# Patient Record
Sex: MALE | Race: WHITE | NOT HISPANIC OR LATINO | Employment: UNEMPLOYED | URBAN - METROPOLITAN AREA
[De-identification: names, ages, dates, MRNs, and addresses within clinical notes are randomized per-mention and may not be internally consistent; named-entity substitution may affect disease eponyms.]

---

## 2022-01-05 ENCOUNTER — HOSPITAL ENCOUNTER (EMERGENCY)
Facility: HOSPITAL | Age: 32
End: 2022-01-06
Attending: EMERGENCY MEDICINE
Payer: COMMERCIAL

## 2022-01-05 ENCOUNTER — APPOINTMENT (EMERGENCY)
Dept: CT IMAGING | Facility: HOSPITAL | Age: 32
End: 2022-01-05
Payer: COMMERCIAL

## 2022-01-05 DIAGNOSIS — F10.231 ALCOHOL WITHDRAWAL DELIRIUM, ACUTE, HYPERACTIVE (HCC): ICD-10-CM

## 2022-01-05 DIAGNOSIS — F11.23 OPIATE WITHDRAWAL (HCC): ICD-10-CM

## 2022-01-05 DIAGNOSIS — R41.82 ALTERED MENTAL STATUS: Primary | ICD-10-CM

## 2022-01-05 LAB
ALBUMIN SERPL BCP-MCNC: 4 G/DL (ref 3.5–5)
ALP SERPL-CCNC: 65 U/L (ref 46–116)
ALT SERPL W P-5'-P-CCNC: 21 U/L (ref 12–78)
ANION GAP SERPL CALCULATED.3IONS-SCNC: 6 MMOL/L (ref 4–13)
APAP SERPL-MCNC: <2 UG/ML (ref 10–20)
AST SERPL W P-5'-P-CCNC: 43 U/L (ref 5–45)
BASOPHILS # BLD AUTO: 0.04 THOUSANDS/ΜL (ref 0–0.1)
BASOPHILS NFR BLD AUTO: 0 % (ref 0–1)
BILIRUB SERPL-MCNC: 0.7 MG/DL (ref 0.2–1)
BUN SERPL-MCNC: 10 MG/DL (ref 5–25)
CALCIUM SERPL-MCNC: 9.2 MG/DL (ref 8.3–10.1)
CHLORIDE SERPL-SCNC: 102 MMOL/L (ref 100–108)
CO2 SERPL-SCNC: 28 MMOL/L (ref 21–32)
CREAT SERPL-MCNC: 0.37 MG/DL (ref 0.6–1.3)
EOSINOPHIL # BLD AUTO: 0 THOUSAND/ΜL (ref 0–0.61)
EOSINOPHIL NFR BLD AUTO: 0 % (ref 0–6)
ERYTHROCYTE [DISTWIDTH] IN BLOOD BY AUTOMATED COUNT: 12.9 % (ref 11.6–15.1)
ETHANOL SERPL-MCNC: <3 MG/DL (ref 0–3)
GFR SERPL CREATININE-BSD FRML MDRD: 165 ML/MIN/1.73SQ M
GLUCOSE SERPL-MCNC: 95 MG/DL (ref 65–140)
GLUCOSE SERPL-MCNC: 96 MG/DL (ref 65–140)
HCT VFR BLD AUTO: 45.9 % (ref 36.5–49.3)
HGB BLD-MCNC: 15.3 G/DL (ref 12–17)
IMM GRANULOCYTES # BLD AUTO: 0.02 THOUSAND/UL (ref 0–0.2)
IMM GRANULOCYTES NFR BLD AUTO: 0 % (ref 0–2)
LYMPHOCYTES # BLD AUTO: 2.45 THOUSANDS/ΜL (ref 0.6–4.47)
LYMPHOCYTES NFR BLD AUTO: 25 % (ref 14–44)
MAGNESIUM SERPL-MCNC: 2.4 MG/DL (ref 1.6–2.6)
MCH RBC QN AUTO: 29.4 PG (ref 26.8–34.3)
MCHC RBC AUTO-ENTMCNC: 33.3 G/DL (ref 31.4–37.4)
MCV RBC AUTO: 88 FL (ref 82–98)
MONOCYTES # BLD AUTO: 0.88 THOUSAND/ΜL (ref 0.17–1.22)
MONOCYTES NFR BLD AUTO: 9 % (ref 4–12)
NEUTROPHILS # BLD AUTO: 6.45 THOUSANDS/ΜL (ref 1.85–7.62)
NEUTS SEG NFR BLD AUTO: 66 % (ref 43–75)
NRBC BLD AUTO-RTO: 0 /100 WBCS
PLATELET # BLD AUTO: 428 THOUSANDS/UL (ref 149–390)
PMV BLD AUTO: 9.7 FL (ref 8.9–12.7)
POTASSIUM SERPL-SCNC: 5.4 MMOL/L (ref 3.5–5.3)
PROT SERPL-MCNC: 7.6 G/DL (ref 6.4–8.2)
RBC # BLD AUTO: 5.21 MILLION/UL (ref 3.88–5.62)
SALICYLATES SERPL-MCNC: <3 MG/DL (ref 3–20)
SODIUM SERPL-SCNC: 136 MMOL/L (ref 136–145)
WBC # BLD AUTO: 9.84 THOUSAND/UL (ref 4.31–10.16)

## 2022-01-05 PROCEDURE — 36415 COLL VENOUS BLD VENIPUNCTURE: CPT | Performed by: EMERGENCY MEDICINE

## 2022-01-05 PROCEDURE — 96361 HYDRATE IV INFUSION ADD-ON: CPT

## 2022-01-05 PROCEDURE — 99285 EMERGENCY DEPT VISIT HI MDM: CPT

## 2022-01-05 PROCEDURE — G1004 CDSM NDSC: HCPCS

## 2022-01-05 PROCEDURE — 99285 EMERGENCY DEPT VISIT HI MDM: CPT | Performed by: EMERGENCY MEDICINE

## 2022-01-05 PROCEDURE — U0005 INFEC AGEN DETEC AMPLI PROBE: HCPCS | Performed by: EMERGENCY MEDICINE

## 2022-01-05 PROCEDURE — 82077 ASSAY SPEC XCP UR&BREATH IA: CPT | Performed by: EMERGENCY MEDICINE

## 2022-01-05 PROCEDURE — 80053 COMPREHEN METABOLIC PANEL: CPT | Performed by: EMERGENCY MEDICINE

## 2022-01-05 PROCEDURE — 83735 ASSAY OF MAGNESIUM: CPT | Performed by: EMERGENCY MEDICINE

## 2022-01-05 PROCEDURE — 80179 DRUG ASSAY SALICYLATE: CPT | Performed by: EMERGENCY MEDICINE

## 2022-01-05 PROCEDURE — 96376 TX/PRO/DX INJ SAME DRUG ADON: CPT

## 2022-01-05 PROCEDURE — 80143 DRUG ASSAY ACETAMINOPHEN: CPT | Performed by: EMERGENCY MEDICINE

## 2022-01-05 PROCEDURE — 85025 COMPLETE CBC W/AUTO DIFF WBC: CPT | Performed by: EMERGENCY MEDICINE

## 2022-01-05 PROCEDURE — 82948 REAGENT STRIP/BLOOD GLUCOSE: CPT

## 2022-01-05 PROCEDURE — 96374 THER/PROPH/DIAG INJ IV PUSH: CPT

## 2022-01-05 PROCEDURE — 96372 THER/PROPH/DIAG INJ SC/IM: CPT

## 2022-01-05 PROCEDURE — 70450 CT HEAD/BRAIN W/O DYE: CPT

## 2022-01-05 PROCEDURE — 93005 ELECTROCARDIOGRAM TRACING: CPT

## 2022-01-05 PROCEDURE — U0003 INFECTIOUS AGENT DETECTION BY NUCLEIC ACID (DNA OR RNA); SEVERE ACUTE RESPIRATORY SYNDROME CORONAVIRUS 2 (SARS-COV-2) (CORONAVIRUS DISEASE [COVID-19]), AMPLIFIED PROBE TECHNIQUE, MAKING USE OF HIGH THROUGHPUT TECHNOLOGIES AS DESCRIBED BY CMS-2020-01-R: HCPCS | Performed by: EMERGENCY MEDICINE

## 2022-01-05 RX ORDER — HALOPERIDOL 5 MG/ML
5 INJECTION INTRAMUSCULAR ONCE
Status: COMPLETED | OUTPATIENT
Start: 2022-01-05 | End: 2022-01-05

## 2022-01-05 RX ORDER — MULTIVITAMIN/IRON/FOLIC ACID 18MG-0.4MG
1 TABLET ORAL DAILY
Status: DISCONTINUED | OUTPATIENT
Start: 2022-01-06 | End: 2022-01-06 | Stop reason: HOSPADM

## 2022-01-05 RX ORDER — LORAZEPAM 2 MG/ML
4 INJECTION INTRAMUSCULAR ONCE
Status: COMPLETED | OUTPATIENT
Start: 2022-01-05 | End: 2022-01-06

## 2022-01-05 RX ORDER — FOLIC ACID 1 MG/1
1 TABLET ORAL DAILY
Status: DISCONTINUED | OUTPATIENT
Start: 2022-01-06 | End: 2022-01-06 | Stop reason: HOSPADM

## 2022-01-05 RX ORDER — LORAZEPAM 2 MG/ML
2 INJECTION INTRAMUSCULAR ONCE
Status: COMPLETED | OUTPATIENT
Start: 2022-01-05 | End: 2022-01-05

## 2022-01-05 RX ORDER — LORAZEPAM 2 MG/ML
4 INJECTION INTRAMUSCULAR ONCE
Status: COMPLETED | OUTPATIENT
Start: 2022-01-05 | End: 2022-01-05

## 2022-01-05 RX ORDER — LANOLIN ALCOHOL/MO/W.PET/CERES
100 CREAM (GRAM) TOPICAL DAILY
Status: DISCONTINUED | OUTPATIENT
Start: 2022-01-06 | End: 2022-01-06 | Stop reason: HOSPADM

## 2022-01-05 RX ADMIN — LORAZEPAM 2 MG: 2 INJECTION INTRAMUSCULAR; INTRAVENOUS at 19:24

## 2022-01-05 RX ADMIN — LORAZEPAM 4 MG: 2 INJECTION INTRAMUSCULAR; INTRAVENOUS at 22:10

## 2022-01-05 RX ADMIN — LORAZEPAM 4 MG: 2 INJECTION INTRAMUSCULAR; INTRAVENOUS at 20:41

## 2022-01-05 RX ADMIN — HALOPERIDOL LACTATE 5 MG: 5 INJECTION, SOLUTION INTRAMUSCULAR at 23:20

## 2022-01-05 RX ADMIN — SODIUM CHLORIDE 1000 ML: 0.9 INJECTION, SOLUTION INTRAVENOUS at 18:53

## 2022-01-05 NOTE — ED PROVIDER NOTES
History  Chief Complaint   Patient presents with    Altered Mental Status     started today while at [facility]; oriented to person, not place/time     This healthy 51-year-old male states he is withdrawing from alcohol and heroin  States he drank about 8 beers a day for the last 5-6 months  Last alcohol intake was 24 hours ago  Says he has been smoking heroin multiple times a day since Thanksgiving  His last dose was yesterday  He went to the [facility] today for help in detoxing but when he became tremulous, weak, confused and had diaphoresis and dysesthesias they sent him here for evaluation  He denies nausea vomiting dyspnea chest pain and abdominal pain  He denies recent illness and injury  None       History reviewed  No pertinent past medical history  History reviewed  No pertinent surgical history  History reviewed  No pertinent family history  I have reviewed and agree with the history as documented  E-Cigarette/Vaping     E-Cigarette/Vaping Substances     Social History     Tobacco Use    Smoking status: Never Smoker    Smokeless tobacco: Never Used   Substance Use Topics    Alcohol use: Yes    Drug use: Yes     Types: Heroin, Fentanyl     Comment: last used yesterday       Review of Systems   Constitutional: Positive for activity change, appetite change, chills, diaphoresis and fatigue  Negative for fever  Respiratory: Negative for shortness of breath  Cardiovascular: Negative for chest pain, palpitations and leg swelling  Neurological: Positive for tremors, weakness and light-headedness  Psychiatric/Behavioral: Positive for confusion and dysphoric mood  All other systems reviewed and are negative  Physical Exam  Physical Exam  Vitals and nursing note reviewed  Constitutional:       General: He is in acute distress  Appearance: He is well-developed  He is ill-appearing and diaphoretic  HENT:      Head: Normocephalic and atraumatic  Right Ear: External ear normal       Left Ear: External ear normal       Mouth/Throat:      Mouth: Mucous membranes are moist       Pharynx: Oropharynx is clear  Eyes:      General: No scleral icterus  Extraocular Movements: Extraocular movements intact  Conjunctiva/sclera: Conjunctivae normal       Pupils: Pupils are equal, round, and reactive to light  Neck:      Vascular: No JVD  Cardiovascular:      Rate and Rhythm: Normal rate and regular rhythm  Heart sounds: Normal heart sounds  No murmur heard  Pulmonary:      Effort: Pulmonary effort is normal       Breath sounds: Normal breath sounds  Abdominal:      General: Bowel sounds are normal       Palpations: Abdomen is soft  There is no mass  Tenderness: There is no abdominal tenderness  There is no guarding or rebound  Musculoskeletal:         General: No swelling or tenderness  Normal range of motion  Cervical back: Normal range of motion and neck supple  No rigidity  Lymphadenopathy:      Cervical: No cervical adenopathy  Skin:     General: Skin is warm  Capillary Refill: Capillary refill takes less than 2 seconds  Findings: No rash  Neurological:      Mental Status: He is alert and oriented to person, place, and time  GCS: GCS eye subscore is 4  GCS verbal subscore is 5  GCS motor subscore is 6  Cranial Nerves: No cranial nerve deficit  Sensory: No sensory deficit  Motor: No weakness  Coordination: Coordination normal       Gait: Gait normal       Deep Tendon Reflexes: Reflexes are normal and symmetric  Comments: At times for very brief periods the patient mumbles incoherently  He is then back to being oriented and able to converse  Psychiatric:         Mood and Affect: Mood is anxious           Behavior: Behavior normal          Vital Signs  ED Triage Vitals [01/05/22 1719]   Temperature Pulse Respirations Blood Pressure SpO2   99 1 °F (37 3 °C) (!) 119 20 129/75 97 % Temp Source Heart Rate Source Patient Position - Orthostatic VS BP Location FiO2 (%)   Temporal Monitor Sitting Left arm --      Pain Score       No Pain           Vitals:    01/06/22 0000 01/06/22 0015 01/06/22 0030 01/06/22 0100   BP: 140/79 130/95 130/81 129/94   Pulse: 90 80 70 71   Patient Position - Orthostatic VS:   Lying          Visual Acuity      ED Medications  Medications   sodium chloride 0 9 % bolus 1,000 mL (0 mL Intravenous Stopped 1/5/22 1953)   LORazepam (ATIVAN) injection 2 mg (2 mg Intravenous Given 1/5/22 1924)   LORazepam (ATIVAN) injection 4 mg (4 mg Intravenous Given 1/5/22 2041)   LORazepam (ATIVAN) injection 4 mg (4 mg Intravenous Given 1/5/22 2210)   haloperidol lactate (HALDOL) injection 5 mg (5 mg Intramuscular Given 1/5/22 2320)   LORazepam (ATIVAN) injection 4 mg (4 mg Intravenous Given 1/6/22 0109)       Diagnostic Studies  Results Reviewed     Procedure Component Value Units Date/Time    HS Troponin 0hr (reflex protocol) [[id number]]  (Normal) Collected: 01/06/22 0007    Lab Status: Final result Specimen: Blood from Arm, Right Updated: 01/06/22 0040     hs TnI 0hr <2 ng/L     Comprehensive metabolic panel [[id number]]  (Abnormal) Collected: 01/05/22 1840    Lab Status: Final result Specimen: Blood from Arm, Right Updated: 01/05/22 2001     Sodium 136 mmol/L      Potassium 5 4 mmol/L      Chloride 102 mmol/L      CO2 28 mmol/L      ANION GAP 6 mmol/L      BUN 10 mg/dL      Creatinine 0 37 mg/dL      Glucose 96 mg/dL      Calcium 9 2 mg/dL      AST 43 U/L      ALT 21 U/L      Alkaline Phosphatase 65 U/L      Total Protein 7 6 g/dL      Albumin 4 0 g/dL      Total Bilirubin 0 70 mg/dL      eGFR 165 ml/min/1 73sq m     Narrative:      Lisa guidelines for Chronic Kidney Disease (CKD):     Stage 1 with normal or high GFR (GFR > 90 mL/min/1 73 square meters)    Stage 2 Mild CKD (GFR = 60-89 mL/min/1 73 square meters)    Stage 3A Moderate CKD (GFR = 45-59 mL/min/1 73 square meters)    Stage 3B Moderate CKD (GFR = 30-44 mL/min/1 73 square meters)    Stage 4 Severe CKD (GFR = 15-29 mL/min/1 73 square meters)    Stage 5 End Stage CKD (GFR <15 mL/min/1 73 square meters)  Note: GFR calculation is accurate only with a steady state creatinine    Acetaminophen level-If concentration is detectable, please discuss with medical  on call   [[id number]]  (Abnormal) Collected: 01/05/22 1840    Lab Status: Final result Specimen: Blood from Arm, Right Updated: 01/05/22 2001     Acetaminophen Level <2 0 ug/mL     Ethanol [[id number]]  (Normal) Collected: 01/05/22 1840    Lab Status: Final result Specimen: Blood from Arm, Right Updated: 01/05/22 1933     Ethanol Lvl <3 mg/dL     Salicylate level [[id number]]  (Abnormal) Collected: 01/05/22 1840    Lab Status: Final result Specimen: Blood from Arm, Right Updated: 91/40/95 9599     Salicylate Lvl <3 mg/dL     Magnesium [[id number]]  (Normal) Collected: 01/05/22 1845    Lab Status: Final result Specimen: Blood from Arm, Right Updated: 01/05/22 1914     Magnesium 2 4 mg/dL     CBC and differential [[id number]]  (Abnormal) Collected: 01/05/22 1840    Lab Status: Final result Specimen: Blood from Arm, Right Updated: 01/05/22 1858     WBC 9 84 Thousand/uL      RBC 5 21 Million/uL      Hemoglobin 15 3 g/dL      Hematocrit 45 9 %      MCV 88 fL      MCH 29 4 pg      MCHC 33 3 g/dL      RDW 12 9 %      MPV 9 7 fL      Platelets 750 Thousands/uL      nRBC 0 /100 WBCs      Neutrophils Relative 66 %      Immat GRANS % 0 %      Lymphocytes Relative 25 %      Monocytes Relative 9 %      Eosinophils Relative 0 %      Basophils Relative 0 %      Neutrophils Absolute 6 45 Thousands/µL      Immature Grans Absolute 0 02 Thousand/uL      Lymphocytes Absolute 2 45 Thousands/µL      Monocytes Absolute 0 88 Thousand/µL      Eosinophils Absolute 0 00 Thousand/µL      Basophils Absolute 0 04 Thousands/µL     Fingerstick Glucose (POCT) [[id number]] (Normal) Collected: 01/05/22 1853    Lab Status: Final result Updated: 01/05/22 1856     POC Glucose 95 mg/dl     COVID only - 48 hour TAT [[id number]] Collected: 01/05/22 1840    Lab Status: In process Specimen: Nares from Nose Updated: 01/05/22 1855                 CT head without contrast   Final Result by Michael Toledo MD (01/05 2227)      No acute intracranial abnormality  Workstation performed: [id number]                    Procedures  ECG 12 Lead Documentation Only    Date/Time: 1/5/2022 6:42 PM  Performed by: Curly Iglesias DO  Authorized by: Curly Iglesias DO     ECG reviewed by me, the ED Provider: yes    Patient location:  ED  Previous ECG:     Previous ECG:  Unavailable    Comparison to cardiac monitor: Yes    Rhythm:     Rhythm: sinus rhythm    Ectopy:     Ectopy: none    QRS:     QRS axis:  Left    QRS intervals:  Normal  Conduction:     Conduction: abnormal      Abnormal conduction: LAFB    ST segments:     ST segments:  Normal  T waves:     T waves: normal               ED Course  ED Course as of 01/06/22 1247   Wed Jan 05, 2022 2037 Patient out of bed, pulled IVs out  Is shivering  It urinate in the bed  Appears somewhat confused  CIWA score is elevated  Will give Ativan  Texted [place] [facility]  2246 Discussed with the detox AP  High ordered troponin as per their request   CT was performed and is unremarkable  Patient placed in soft limb restraints she keeps on trying to eat his electrodes  SBIRT 20yo+      Most Recent Value   SBIRT (24 yo +)    In order to provide better care to our patients, we are screening all of our patients for alcohol and drug use  Would it be okay to ask you these screening questions? Yes Filed at: 01/05/2022 1835   Initial Alcohol Screen: US AUDIT-C     1  How often do you have a drink containing alcohol? 6 Filed at: 01/05/2022 1835   2   How many drinks containing alcohol do you have on a typical day you are drinking? 5 Filed at: 01/05/2022 1835   3a  Male UNDER 65: How often do you have five or more drinks on one occasion? 6 Filed at: 01/05/2022 1835   3b  FEMALE Any Age, or MALE 65+: How often do you have 4 or more drinks on one occassion? 0 Filed at: 01/05/2022 1835   Audit-C Score 17 Filed at: 01/05/2022 1835   Full Alcohol Screen: US AUDIT    4  How often during the last year have you found that you were not able to stop drinking once you had started? 3 Filed at: 01/05/2022 1835   5  How often during past year have you failed to do what was normally expected of you because of drinking? 3 Filed at: 01/05/2022 1835   6  How often in past year have you needed a first drink in the morning to get yourself going after a heavy drinking session? 3 Filed at: 01/05/2022 1835   7  How often in past year have you had feeling of guilt or remorse after drinking? 3 Filed at: 01/05/2022 1835   8  How often in past year have you been unable to remember what happened night before because you had been drinking? 3 Filed at: 01/05/2022 1835   9  Have you or someone else been injured as a result of your drinking? 0 Filed at: 01/05/2022 1835   10  Has a relative, friend, doctor or other health worker been concerned about your drinking and suggested you cut down?  0 Filed at: 01/05/2022 1835   AUDIT Total Score 32 Filed at: 01/05/2022 1835   OTTO: How many times in the past year have you    Used an illegal drug or used a prescription medication for non-medical reasons? Daily or Almost Daily Filed at: 01/05/2022 1835   DAST-10: In the past 12 months       1  Have you used drugs other than those required for medical reasons? 1 Filed at: 01/05/2022 1835   2  Do you use more than one drug at a time? 1 Filed at: 01/05/2022 1835   3  Have you had medical problems as a result of your drug use (e g , memory loss, hepatitis, convulsions, bleeding, etc )? 0 Filed at: 01/05/2022 3785   4   Have you had "blackouts" or "flashbacks" as a result of drug use? YesNo 1 Filed at: 01/05/2022 1835   5  Do you ever feel bad or guilty about your drug use? 1 Filed at: 01/05/2022 1835   6  Does your spouse (or parent) ever complain about your involvement with drugs? 0 Filed at: 01/05/2022 1835   7  Have you neglected your family because of your use of drugs? 0 Filed at: 01/05/2022 1835   8  Have you engaged in illegal activities in order to obtain drugs? 0 Filed at: 01/05/2022 1835   9  Have you ever experienced withdrawal symptoms (felt sick) when you stopped taking drugs? 1 Filed at: 01/05/2022 1835   10  Are you always able to stop using drugs when you want to? 1 Filed at: 01/05/2022 1835   DAST-10 Score 6 Filed at: 01/05/2022 1835                    MDM  Number of Diagnoses or Management Options  Alcohol withdrawal delirium, acute, hyperactive ([place] [address] ): new and requires workup  Altered mental status: new and requires workup  Opiate withdrawal [facility]): new and requires workup  Diagnosis management comments: 37 y/o male withdrawing from alcohol and heroin  States also was abusing benzodiazepines, but received lorazepam several times at [facility] today  Patient remained hemodynamically stable but had worsening CIWA scores and required soft limb restraints       Amount and/or Complexity of Data Reviewed  Clinical lab tests: ordered and reviewed  Tests in the radiology section of CPT®: ordered and reviewed  Review and summarize past medical records: yes  Discuss the patient with other providers: yes  Independent visualization of images, tracings, or specimens: yes        Disposition  Final diagnoses:    Altered mental status   Alcohol withdrawal delirium, acute, hyperactive ([facility] 75 )   Opiate withdrawal ([facility] 75 )     Time reflects when diagnosis was documented in both MDM as applicable and the Disposition within this note     Time User Action Codes Description Comment    1/5/2022 11:17 PM Shannan Galvez Add [R42 36] Altered mental status     1/5/2022 11:18 PM Kip Mini Add [M08 823] Alcohol withdrawal delirium, acute, hyperactive ([facility] )     1/5/2022 11:18 PM Kip Mini Add [H97 91] Opiate withdrawal [facility])       ED Disposition     ED Disposition Condition Date/Time Comment    Transfer to Another Facility-In Network  Wed Jan 5, 2022 10:59 PM          MD Documentation      Most Recent Value   Patient Condition The patient has been stabilized such that within reasonable medical probability, no material deterioration of the patient condition or the condition of the unborn child(brando) is likely to result from the transfer   Reason for Transfer Level of Care needed not available at this facility   Benefits of Transfer Specialized equipment and/or services available at the receiving facility (Include comment)________________________   Risks of Transfer Potential for delay in receiving treatment, Potential deterioration of medical condition, Loss of IV, Increased discomfort during transfer   Accepting Physician [address] [facility], [address] [facility]    (Name & Tel number) pacs   Transported by (Company and Unit #) [place]   Sending MD [facility]   Provider Certification General risk, such as traffic hazards, adverse weather conditions, rough terrain or turbulence, possible failure of equipment (including vehicle or aircraft), or consequences of actions of persons outside the control of the transport personnel, Unanticipated needs of medical equipment and personnel during transport, Risk of worsening condition      RN Documentation      Most [facility] Name, [facility] (Name & Tel number) pacs   Transported by (Company and Unit #) [employer]      Follow-up Information    None         There are no discharge medications for this patient  No discharge procedures on file      PDMP Review     None          ED Provider  Electronically Signed by           Tameka Jackson DO  01/06/22 1252

## 2022-01-06 ENCOUNTER — HOSPITAL ENCOUNTER (INPATIENT)
Facility: HOSPITAL | Age: 32
LOS: 2 days | Discharge: OTHER INSTITUTION | DRG: 773 | End: 2022-01-08
Attending: EMERGENCY MEDICINE | Admitting: EMERGENCY MEDICINE
Payer: COMMERCIAL

## 2022-01-06 VITALS
RESPIRATION RATE: 20 BRPM | HEIGHT: 75 IN | DIASTOLIC BLOOD PRESSURE: 94 MMHG | TEMPERATURE: 99.1 F | SYSTOLIC BLOOD PRESSURE: 129 MMHG | WEIGHT: 175 LBS | HEART RATE: 71 BPM | OXYGEN SATURATION: 98 % | BODY MASS INDEX: 21.76 KG/M2

## 2022-01-06 DIAGNOSIS — F10.239 ALCOHOL WITHDRAWAL SYNDROME WITH COMPLICATION (HCC): ICD-10-CM

## 2022-01-06 DIAGNOSIS — F10.20 ALCOHOL USE DISORDER, SEVERE, DEPENDENCE (HCC): ICD-10-CM

## 2022-01-06 DIAGNOSIS — F13.10 BENZODIAZEPINE ABUSE (HCC): ICD-10-CM

## 2022-01-06 DIAGNOSIS — F11.20 OPIOID USE DISORDER, SEVERE, DEPENDENCE (HCC): Primary | ICD-10-CM

## 2022-01-06 PROBLEM — G93.40 ACUTE ENCEPHALOPATHY: Status: ACTIVE | Noted: 2022-01-06

## 2022-01-06 PROBLEM — F15.23: Status: ACTIVE | Noted: 2022-01-06

## 2022-01-06 PROBLEM — E87.6 HYPOKALEMIA: Status: ACTIVE | Noted: 2022-01-06

## 2022-01-06 PROBLEM — F13.20 BENZODIAZEPINE DEPENDENCE (HCC): Status: ACTIVE | Noted: 2022-01-06

## 2022-01-06 LAB
2HR DELTA HS TROPONIN: <0 NG/L
ALBUMIN SERPL BCP-MCNC: 4 G/DL (ref 3–5.2)
ALP SERPL-CCNC: 53 U/L (ref 43–122)
ALT SERPL W P-5'-P-CCNC: 18 U/L
ANION GAP SERPL CALCULATED.3IONS-SCNC: 4 MMOL/L (ref 5–14)
AST SERPL W P-5'-P-CCNC: 19 U/L (ref 17–59)
ATRIAL RATE: 89 BPM
BASOPHILS # BLD AUTO: 0.1 THOUSANDS/ΜL (ref 0–0.1)
BASOPHILS NFR BLD AUTO: 1 % (ref 0–1)
BILIRUB SERPL-MCNC: 0.63 MG/DL
BUN SERPL-MCNC: 8 MG/DL (ref 5–25)
CALCIUM SERPL-MCNC: 8.6 MG/DL (ref 8.4–10.2)
CARDIAC TROPONIN I PNL SERPL HS: 2 NG/L
CARDIAC TROPONIN I PNL SERPL HS: <2 NG/L
CARDIAC TROPONIN I PNL SERPL HS: <2 NG/L
CHLORIDE SERPL-SCNC: 107 MMOL/L (ref 97–108)
CO2 SERPL-SCNC: 27 MMOL/L (ref 22–30)
CREAT SERPL-MCNC: 0.52 MG/DL (ref 0.7–1.5)
EOSINOPHIL # BLD AUTO: 0 THOUSAND/ΜL (ref 0–0.4)
EOSINOPHIL NFR BLD AUTO: 0 % (ref 0–6)
ERYTHROCYTE [DISTWIDTH] IN BLOOD BY AUTOMATED COUNT: 13.8 %
GFR SERPL CREATININE-BSD FRML MDRD: 144 ML/MIN/1.73SQ M
GLUCOSE SERPL-MCNC: 110 MG/DL (ref 70–99)
HCT VFR BLD AUTO: 41.6 % (ref 41–53)
HGB BLD-MCNC: 14.1 G/DL (ref 13.5–17.5)
LYMPHOCYTES # BLD AUTO: 2.5 THOUSANDS/ΜL (ref 0.5–4)
LYMPHOCYTES NFR BLD AUTO: 25 % (ref 25–45)
MAGNESIUM SERPL-MCNC: 2.1 MG/DL (ref 1.6–2.3)
MCH RBC QN AUTO: 29.8 PG (ref 26–34)
MCHC RBC AUTO-ENTMCNC: 33.8 G/DL (ref 31–36)
MCV RBC AUTO: 88 FL (ref 80–100)
MONOCYTES # BLD AUTO: 0.8 THOUSAND/ΜL (ref 0.2–0.9)
MONOCYTES NFR BLD AUTO: 8 % (ref 1–10)
NEUTROPHILS # BLD AUTO: 6.6 THOUSANDS/ΜL (ref 1.8–7.8)
NEUTS SEG NFR BLD AUTO: 67 % (ref 45–65)
P AXIS: 57 DEGREES
PHOSPHATE SERPL-MCNC: 3.2 MG/DL (ref 2.5–4.8)
PLATELET # BLD AUTO: 343 THOUSANDS/UL (ref 150–450)
PMV BLD AUTO: 7.8 FL (ref 8.9–12.7)
POTASSIUM SERPL-SCNC: 3.5 MMOL/L (ref 3.6–5)
POTASSIUM SERPL-SCNC: 3.7 MMOL/L (ref 3.6–5)
PR INTERVAL: 142 MS
PROT SERPL-MCNC: 7 G/DL (ref 5.9–8.4)
QRS AXIS: -67 DEGREES
QRSD INTERVAL: 86 MS
QT INTERVAL: 350 MS
QTC INTERVAL: 425 MS
RBC # BLD AUTO: 4.72 MILLION/UL (ref 4.5–5.9)
SODIUM SERPL-SCNC: 138 MMOL/L (ref 137–147)
T WAVE AXIS: 55 DEGREES
VENTRICULAR RATE: 89 BPM
WBC # BLD AUTO: 9.9 THOUSAND/UL (ref 4.5–11)

## 2022-01-06 PROCEDURE — 36415 COLL VENOUS BLD VENIPUNCTURE: CPT | Performed by: EMERGENCY MEDICINE

## 2022-01-06 PROCEDURE — 84132 ASSAY OF SERUM POTASSIUM: CPT | Performed by: PHYSICIAN ASSISTANT

## 2022-01-06 PROCEDURE — 83735 ASSAY OF MAGNESIUM: CPT | Performed by: PHYSICIAN ASSISTANT

## 2022-01-06 PROCEDURE — 93010 ELECTROCARDIOGRAM REPORT: CPT | Performed by: INTERNAL MEDICINE

## 2022-01-06 PROCEDURE — 84484 ASSAY OF TROPONIN QUANT: CPT | Performed by: PHYSICIAN ASSISTANT

## 2022-01-06 PROCEDURE — 84484 ASSAY OF TROPONIN QUANT: CPT | Performed by: EMERGENCY MEDICINE

## 2022-01-06 PROCEDURE — 84100 ASSAY OF PHOSPHORUS: CPT | Performed by: PHYSICIAN ASSISTANT

## 2022-01-06 PROCEDURE — 80053 COMPREHEN METABOLIC PANEL: CPT | Performed by: PHYSICIAN ASSISTANT

## 2022-01-06 PROCEDURE — 99291 CRITICAL CARE FIRST HOUR: CPT | Performed by: PHYSICIAN ASSISTANT

## 2022-01-06 PROCEDURE — NC001 PR NO CHARGE: Performed by: PHYSICIAN ASSISTANT

## 2022-01-06 PROCEDURE — 96376 TX/PRO/DX INJ SAME DRUG ADON: CPT

## 2022-01-06 PROCEDURE — 85025 COMPLETE CBC W/AUTO DIFF WBC: CPT | Performed by: PHYSICIAN ASSISTANT

## 2022-01-06 RX ORDER — SODIUM CHLORIDE 9 MG/ML
125 INJECTION, SOLUTION INTRAVENOUS CONTINUOUS
Status: DISCONTINUED | OUTPATIENT
Start: 2022-01-06 | End: 2022-01-07

## 2022-01-06 RX ORDER — POTASSIUM CHLORIDE 14.9 MG/ML
20 INJECTION INTRAVENOUS ONCE
Status: COMPLETED | OUTPATIENT
Start: 2022-01-06 | End: 2022-01-06

## 2022-01-06 RX ORDER — ONDANSETRON 2 MG/ML
4 INJECTION INTRAMUSCULAR; INTRAVENOUS EVERY 6 HOURS PRN
Status: CANCELLED | OUTPATIENT
Start: 2022-01-06

## 2022-01-06 RX ORDER — PHENOBARBITAL SODIUM 130 MG/ML
130 INJECTION INTRAMUSCULAR ONCE
Status: COMPLETED | OUTPATIENT
Start: 2022-01-06 | End: 2022-01-06

## 2022-01-06 RX ORDER — POTASSIUM CHLORIDE 20 MEQ/1
40 TABLET, EXTENDED RELEASE ORAL ONCE
Status: DISCONTINUED | OUTPATIENT
Start: 2022-01-06 | End: 2022-01-06

## 2022-01-06 RX ORDER — BUPRENORPHINE AND NALOXONE 8; 2 MG/1; MG/1
8 FILM, SOLUBLE BUCCAL; SUBLINGUAL 2 TIMES DAILY
Status: DISCONTINUED | OUTPATIENT
Start: 2022-01-07 | End: 2022-01-08 | Stop reason: HOSPADM

## 2022-01-06 RX ORDER — CLONIDINE HYDROCHLORIDE 0.1 MG/1
0.1 TABLET ORAL EVERY 6 HOURS PRN
Status: DISCONTINUED | OUTPATIENT
Start: 2022-01-06 | End: 2022-01-08 | Stop reason: HOSPADM

## 2022-01-06 RX ORDER — BUPRENORPHINE AND NALOXONE 8; 2 MG/1; MG/1
8 FILM, SOLUBLE BUCCAL; SUBLINGUAL 2 TIMES DAILY
Status: DISCONTINUED | OUTPATIENT
Start: 2022-01-06 | End: 2022-01-06

## 2022-01-06 RX ORDER — GABAPENTIN 300 MG/1
300 CAPSULE ORAL EVERY 8 HOURS PRN
Status: DISCONTINUED | OUTPATIENT
Start: 2022-01-06 | End: 2022-01-08 | Stop reason: HOSPADM

## 2022-01-06 RX ORDER — PHENOBARBITAL 64.8 MG/1
64.8 TABLET ORAL ONCE
Status: COMPLETED | OUTPATIENT
Start: 2022-01-06 | End: 2022-01-06

## 2022-01-06 RX ORDER — ESCITALOPRAM OXALATE 10 MG/1
10 TABLET ORAL DAILY
Status: DISCONTINUED | OUTPATIENT
Start: 2022-01-06 | End: 2022-01-08 | Stop reason: HOSPADM

## 2022-01-06 RX ORDER — SODIUM CHLORIDE 9 MG/ML
125 INJECTION, SOLUTION INTRAVENOUS CONTINUOUS
Status: CANCELLED | OUTPATIENT
Start: 2022-01-06

## 2022-01-06 RX ORDER — CLONIDINE HYDROCHLORIDE 0.1 MG/1
0.1 TABLET ORAL EVERY 6 HOURS PRN
Status: CANCELLED | OUTPATIENT
Start: 2022-01-06

## 2022-01-06 RX ORDER — GABAPENTIN 300 MG/1
300 CAPSULE ORAL EVERY 8 HOURS PRN
Status: CANCELLED | OUTPATIENT
Start: 2022-01-06

## 2022-01-06 RX ORDER — ONDANSETRON 2 MG/ML
4 INJECTION INTRAMUSCULAR; INTRAVENOUS EVERY 6 HOURS PRN
Status: DISCONTINUED | OUTPATIENT
Start: 2022-01-06 | End: 2022-01-08 | Stop reason: HOSPADM

## 2022-01-06 RX ADMIN — ENOXAPARIN SODIUM 40 MG: 40 INJECTION SUBCUTANEOUS at 09:30

## 2022-01-06 RX ADMIN — PHENOBARBITAL SODIUM 130 MG: 130 INJECTION INTRAMUSCULAR at 11:19

## 2022-01-06 RX ADMIN — ONDANSETRON 4 MG: 2 INJECTION INTRAMUSCULAR; INTRAVENOUS at 14:11

## 2022-01-06 RX ADMIN — MULTIPLE VITAMINS W/ MINERALS TAB 1 TABLET: TAB ORAL at 09:35

## 2022-01-06 RX ADMIN — SODIUM CHLORIDE 125 ML/HR: 0.9 INJECTION, SOLUTION INTRAVENOUS at 02:37

## 2022-01-06 RX ADMIN — Medication 650 MG: at 02:36

## 2022-01-06 RX ADMIN — SODIUM CHLORIDE 125 ML/HR: 0.9 INJECTION, SOLUTION INTRAVENOUS at 20:46

## 2022-01-06 RX ADMIN — SODIUM CHLORIDE 125 ML/HR: 0.9 INJECTION, SOLUTION INTRAVENOUS at 12:17

## 2022-01-06 RX ADMIN — PHENOBARBITAL SODIUM 130 MG: 130 INJECTION INTRAMUSCULAR at 13:21

## 2022-01-06 RX ADMIN — THIAMINE HYDROCHLORIDE 500 MG: 100 INJECTION, SOLUTION INTRAMUSCULAR; INTRAVENOUS at 21:00

## 2022-01-06 RX ADMIN — THIAMINE HYDROCHLORIDE 500 MG: 100 INJECTION, SOLUTION INTRAMUSCULAR; INTRAVENOUS at 13:44

## 2022-01-06 RX ADMIN — ESCITALOPRAM OXALATE 10 MG: 10 TABLET ORAL at 09:35

## 2022-01-06 RX ADMIN — BUPRENORPHINE AND NALOXONE 8 MG: 8; 2 FILM BUCCAL; SUBLINGUAL at 14:02

## 2022-01-06 RX ADMIN — FOLIC ACID 1 MG: 5 INJECTION, SOLUTION INTRAMUSCULAR; INTRAVENOUS; SUBCUTANEOUS at 09:30

## 2022-01-06 RX ADMIN — LORAZEPAM 4 MG: 2 INJECTION INTRAMUSCULAR; INTRAVENOUS at 01:09

## 2022-01-06 RX ADMIN — POTASSIUM CHLORIDE 20 MEQ: 14.9 INJECTION, SOLUTION INTRAVENOUS at 04:08

## 2022-01-06 RX ADMIN — PHENOBARBITAL SODIUM 130 MG: 130 INJECTION INTRAMUSCULAR at 09:45

## 2022-01-06 RX ADMIN — PHENOBARBITAL SODIUM 130 MG: 130 INJECTION INTRAMUSCULAR at 12:12

## 2022-01-06 RX ADMIN — PHENOBARBITAL 64.8 MG: 64.8 TABLET ORAL at 10:30

## 2022-01-06 RX ADMIN — THIAMINE HYDROCHLORIDE 500 MG: 100 INJECTION, SOLUTION INTRAMUSCULAR; INTRAVENOUS at 03:44

## 2022-01-06 NOTE — ED NOTES
Transport 0100 with [facility] Detox Bed 514  Dr Marva Gary   [phone] RN Report     Meg Pollock RN  01/06/22 8174

## 2022-01-06 NOTE — ASSESSMENT & PLAN NOTE
· Reports daily use of benzodiazepines x 5 yrs (xanax and ativan)  · 3-4 "tabs" daily (uncertain of exact dose)  Reports last use 1/3/2022  · Discontinue SEWS  Patient received a total of 0798 mg without complication

## 2022-01-06 NOTE — CASE MANAGEMENT
Rufus received a call from Marilyn, nursing staff, at [facility] requesting an update on pt  Rufus provided this update and discussed possible return to [facility]  Rufus discussed if there would be any issues with pt returning on the weekend  Marilyn stated she would speak with her nursing supervisor about this  Marilyn provided nursing cell phone number as [phone]

## 2022-01-06 NOTE — ASSESSMENT & PLAN NOTE
· Reports daily use of benzodiazepines x 5 yrs (xanax and ativan)  · 3-4 "tabs" daily (uncertain of exact dose)  · Reports last use 1/3/2022  · Follow [facility] protocol for medically-assisted benzodiazepine withdrawal  · Received 14 mg total of ativan in ED PTA- hold further benzos at this time

## 2022-01-06 NOTE — ASSESSMENT & PLAN NOTE
· Patient originally presented to [facility] ED 1/5/2022- sent from [facility] for evaluation of confusion, diaphoresis, hallucinations   · Patient received 14 mg total ativan along with 5 mg haldol in ED PTA  · Arrives in 2 point soft restraints  · PTA work-up: urine sample unable to be obtained PTA*  · CT head 1/5/2022: "no acute intracranial abnormality"  · Coma panel 1/5/2022: acetaminophen level <2, salicylate level <3, ethanol level <3  · CBC 1/5/2022: WBCs 9 84, ANC 6 45  · CMP 1/5/2022: sodium 136, K+ 5 4, Anion gap 6  · Potassium 1/6/2022: 3 5  · Magnesium 2 4  · Currently- Patient oriented to person only, confused, Diaphoretic, hallucinating, frequently moving upper extremities attempting to chew on blankets/equipment wires  · Alertness/cognition appears to fluctuate (confused to coherent)  · Exact etiology unclear: possibly 2/2 delirium tremens vs  wernickes vs drug-induced psychosis   · initiate high-dose thiamine supplementation  · Continue 2 point soft restraints for now  · Initiate continuous 1:1 observation

## 2022-01-06 NOTE — ASSESSMENT & PLAN NOTE
H/o chronic alcohol use; positive h/o withdrawal seizure  · Last drink 1/4/2022  Ethanol  1/5/2022 18:40pm: <3   Follow SEWS protocol for medically-assisted alcohol withdrawal  · Received 14 mg total of ativan in ED PTA- hold further benzos at this time  · Initial SEWS 13- administer 650 mg phenobarbital now

## 2022-01-06 NOTE — ASSESSMENT & PLAN NOTE
H/o chronic alcohol use; positive h/o withdrawal seizure  · Last drink 1/4/2022  Ethanol  1/5/2022 18:40pm: <3   · SEWS protocol discontinued on 1/6/22   Patient received a total of 1678 mg without complication  Will continue thiamine supplementation daily

## 2022-01-06 NOTE — NURSING NOTE
Patient has been sleeping comfortably after receiving 8 mg of suboxone and 4 mg of zofran  Have not completed SEWs score at this time  Dr France Otero aware of update   Will monitor

## 2022-01-06 NOTE — ASSESSMENT & PLAN NOTE
Patient with h/o chronic alcohol use  Per patient- currently consumes 12-18 12-oz cans of beer daily   · Last drink 1/4/2022  · Patient will be discharged to [facility]

## 2022-01-06 NOTE — ASSESSMENT & PLAN NOTE
· K+ checked on admission 1/6/2022: 3 5  · K + 3 3  · Replaced with 40 mEq of potassium chloride   · Replaced morning labs

## 2022-01-06 NOTE — ASSESSMENT & PLAN NOTE
Patient with h/o chronic alcohol use  Per patient- currently consumes 12-18 12-oz cans of beer daily   · Last drink 1/4/2022  Pertinent labs:  CBC 1/5/2022:  hemoglobin 15 3, MCV 88, platelets 111   CMP 3/1/0398: sodium 136, K+ 5 4  AG 6   LFTs AST 43, ALT 21, Alk phos 65   Ethanol 1/5/2022 18:40pm: <3   Initiate IVFs, daily thiamine/folic acid supplementation  [facility] withdrawal  Consult case management for assistance with rehab resources

## 2022-01-06 NOTE — EMTALA/ACUTE CARE TRANSFER
[facility] EMERGENCY DEPARTMENT  [address] [facility] [address] [address]  Dept: [phone]      PETRTRAOLIVER TRANSFER CONSENT    NAME Juliocesar Freire DOB 1990                              MRN [id number]    I have been informed of my rights regarding examination, treatment, and transfer   by Dr Aga Mcelroy DO    Benefits: Specialized equipment and/or services available at the receiving facility (Include comment)________________________    Risks: Potential for delay in receiving treatment,Potential deterioration of medical condition,Loss of IV,Increased discomfort during transfer      Consent for Transfer:  I acknowledge that my medical condition has been evaluated and explained to me by the emergency department physician or other qualified medical person and/or my attending physician, who has recommended that I be transferred to the service of  Accepting Physician: Tank Little at [address], [address] : [facility]  The above potential benefits of such transfer, the potential risks associated with such transfer, and the probable risks of not being transferred have been explained to me, and I fully understand them  The doctor has explained that, in my case, the benefits of transfer outweigh the risks  I agree to be transferred  I authorize the performance of emergency medical procedures and treatments upon me in both transit and upon arrival at the receiving facility  Additionally, I authorize the release of any and all medical records to the receiving facility and request they be transported with me, if possible  I understand that the safest mode of transportation during a medical emergency is an ambulance and that the Hospital advocates the use of this mode of transport   Risks of traveling to the receiving facility by car, including absence of medical control, life sustaining equipment, such as oxygen, and medical personnel has been explained to me and I fully understand them  (HUYEN CORRECT BOX BELOW)  [X]  I consent to the stated transfer and to be transported by ambulance/helicopter  [  ]  I consent to the stated transfer, but refuse transportation by ambulance and accept full responsibility for my transportation by car  I understand the risks of non-ambulance transfers and I exonerate the Hospital and its staff from any deterioration in my condition that results from this refusal     X___________________________________________    DATE  22  TIME________  Signature of patient or legally responsible individual signing on patient behalf           RELATIONSHIP TO PATIENT_________________________          Provider Certification    NAME Sam Gallagher                                         1990                              MRN [id number]    A medical screening exam was performed on the above named patient  Based on the examination:    Condition Necessitating Transfer The primary encounter diagnosis was Altered mental status  Diagnoses of Alcohol withdrawal delirium, acute, hyperactive ([address] ) and Opiate withdrawal ([facility] [address] ) were also pertinent to this visit      Patient Condition: The patient has been stabilized such that within reasonable medical probability, no material deterioration of the patient condition or the condition of the unborn child(brando) is likely to result from the transfer    Reason for Transfer: Level of Care needed not available at this facility    Transfer Requirements:  [address]   · Space available and qualified personnel available for treatment as acknowledged by pacs  · Agreed to accept transfer and to provide appropriate medical treatment as acknowledged by       Geovani  · Appropriate medical records of the examination and treatment of the patient are provided at the time of transfer   [address] _______  · Transfer will be performed by qualified personnel from [facility]  and appropriate transfer equipment as required, including the use of necessary and appropriate life support measures  Provider Certification: I have examined the patient and explained the following risks and benefits of being transferred/refusing transfer to the patient/family:  General risk, such as traffic hazards, adverse weather conditions, rough terrain or turbulence, possible failure of equipment (including vehicle or aircraft), or consequences of actions of persons outside the control of the transport personnel,Unanticipated needs of medical equipment and personnel during transport,Risk of worsening condition      Based on these reasonable risks and benefits to the patient and/or the unborn child(brando), and based upon the information available at the time of the patients examination, I certify that the medical benefits reasonably to be expected from the provision of appropriate medical treatments at another medical facility outweigh the increasing risks, if any, to the individuals medical condition, and in the case of labor to the unborn child, from effecting the transfer      X____________________________________________ DATE 01/05/22        TIME_______      ORIGINAL - SEND TO MEDICAL RECORDS   COPY - SEND WITH PATIENT DURING TRANSFER

## 2022-01-06 NOTE — NURSING NOTE
Patient arrived via EMS in soft restraints  Patient restless and labile  Restraints continued at this time for unsafe behavior  Patient only oriented to self, confused and disoriented  Answers questions inappropriately or not at all, admission difficult to complete  He states he is in the [employer]  While laying in bed he thrusts his hips in the air and when asked what he is doing he states nothing  He pulls at his pulse ox and states he is trying to read  Patient with hallucinations  He also attempts to put leads in his mouth or chew on the blankets  Patient placed on continual observation

## 2022-01-06 NOTE — PLAN OF CARE
Problem: SUBSTANCE USE/ABUSE  Goal: By discharge, will develop insight into their chemical dependency and sustain motivation to continue in recovery  Description: INTERVENTIONS:  - Attends all daily group sessions and scheduled AA groups  - Actively practices coping skills through participation in the therapeutic community and adherence to program rules  - Reviews and completes assignments from individual treatment plan  - Assist patient development of understanding of their personal cycle of addiction and relapse triggers  Outcome: Progressing  Goal: By discharge, patient will have ongoing treatment plan addressing chemical dependency  Description: INTERVENTIONS:  - Assist patient with resources and/or appointments for ongoing recovery based living  Outcome: Progressing     Problem: SAFETY,RESTRAINT: NV/NON-SELF DESTRUCTIVE BEHAVIOR  Goal: Remains free of harm/injury (restraint for non violent/non self-detsructive behavior)  Description: INTERVENTIONS:  - Instruct patient/family regarding restraint use   - Assess and monitor physiologic and psychological status   - Provide interventions and comfort measures to meet assessed patient needs   - Identify and implement measures to help patient regain control  - Assess readiness for release of restraint   Outcome: Progressing  Goal: Returns to optimal restraint-free functioning  Description: INTERVENTIONS:  - Assess the patient's behavior and symptoms that indicate continued need for restraint  - Identify and implement measures to help patient regain control  - Assess readiness for release of restraint   Outcome: Not Progressing

## 2022-01-06 NOTE — ASSESSMENT & PLAN NOTE
· Patient originally presented to [facility] ED 1/5/2022- sent from [facility] for evaluation of confusion, diaphoresis, hallucinations   · Patient received 14 mg total ativan along with 5 mg haldol in ED PTA  · Arrives in 2 point soft restraints  · PTA work-up: urine sample unable to be obtained PTA*  · CT head 1/5/2022: "no acute intracranial abnormality"  · Coma panel 1/5/2022: acetaminophen level <2, salicylate level <3, ethanol level <3  · CBC 1/5/2022: WBCs 9 84, ANC 6 45  · CMP 1/5/2022: sodium 136, K+ 5 4, Anion gap 6  · Potassium 1/6/2022: 3 5  · Magnesium 2 4  · Currently- Patient oriented to person only  He denies any confusion or hallucinations  Patient is able to follow direction  Patient states that he does not remember previous episodes of delirium  · Upon re-assessment patient had episodes of somnolence  Pupils sluggish  · Continue thiamine supplementation

## 2022-01-06 NOTE — H&P
[facility]  H&P- Leandro Members 1990, 36 y o  male MRN: [id number]  Unit/Bed#: 5T DETOX 416-69 Encounter: [id number]  Primary Care Provider: Osmin Schmidt   Date and time admitted to hospital: 1/6/2022  1:52 AM    HISTORY & PHYSICAL EXAM  [facility]  LEVEL 4 MEDICAL DETOX UNIT  Leandro Members 36 y o  male MRN: [id number]  Unit/Bed#: 5T DETOX 514-01 Encounter: [id number]    Reason for Admission/Principal Problem: Ethanol withdrawal, Ethanol use disorder  Admitting Provider: Greta Castano PA-C  Attending Provider: Marcine Cowden, DO   1/6/2022  1:52 AM    * Alcohol withdrawal syndrome with complication [facility])  Assessment & Plan  H/o chronic alcohol use; positive h/o withdrawal seizure  · Last drink 1/4/2022  Ethanol  1/5/2022 18:40pm: <3   Follow SEWS protocol for medically-assisted alcohol withdrawal  · Received 14 mg total of ativan in ED PTA- hold further benzos at this time  · Initial SEWS 13- administer 650 mg phenobarbital now    Alcohol use disorder, severe, dependence ([facility] [address] )  Assessment & Plan  Patient with h/o chronic alcohol use  Per patient- currently consumes 12-18 12-oz cans of beer daily   · Last drink 1/4/2022  Pertinent labs:  CBC 1/5/2022:  hemoglobin 15 3, MCV 88, platelets 425   CMP 2/0/1308: sodium 136, K+ 5 4  AG 6   LFTs AST 43, ALT 21, Alk phos 65   Ethanol 1/5/2022 18:40pm: <3   Initiate IVFs, daily thiamine/folic acid supplementation  Follow SEWS protocol for medically-assisted alcohol withdrawal  Consult case management for assistance with rehab resources    Opioid use disorder, severe, dependence (HCC)  Assessment & Plan  · Snorts heroin/fentanyl: 20 bags daily    · Last use: 1/3/2022  · Previously prescribed suboxone in the past  · Per PDMP- last script written/filled 10/25/2021  · Patient also admits to using suboxone without prescription  · States last use 3 weeks ago   · Follow COWS protocol for medically assisted opioid withdrawal   · Plan to initiate MAT protocol once stable from alcohol withdrawal    Benzodiazepine abuse ([facility] [address] )  Assessment & Plan  · Reports daily use of benzodiazepines x 5 yrs (xanax and ativan)  · 3-4 "tabs" daily (uncertain of exact dose)  · Reports last use 1/3/2022  · [facility]odiazepine withdrawal  · Received 14 mg total of ativan in ED PTA- hold further benzos at this time    Acute encephalopathy  Assessment & Plan  · Patient originally presented [facility]/5/[facility]- sent from [facility] for evaluation of confusion, diaphoresis, hallucinations   · Patient received 14 mg total ativan along with 5 mg haldol in ED PTA  · Arrives in 2 point soft restraints  · PTA work-up: urine sample unable to be obtained PTA*  · CT head 1/5/2022: "no acute intracranial abnormality"  · Coma panel 1/5/2022: acetaminophen level <2, salicylate level <3, ethanol level <3  · CBC 1/5/2022: WBCs 9 84, ANC 6 45  · CMP 1/5/2022: sodium 136, K+ 5 4, Anion gap 6  · Potassium 1/6/2022: 3 5  · Magnesium 2 4  · Currently- Patient oriented to person only, confused, Diaphoretic, hallucinating, frequently moving upper extremities attempting to chew on blankets/equipment wires  · Alertness/cognition appears to fluctuate (confused to coherent)  · Exact etiology unclear: possibly 2/2 delirium tremens vs  wernickes vs drug-induced psychosis   · initiate high-dose thiamine supplementation  · Continue 2 point soft restraints for now  · Initiate continuous 1:1 observation    Hypokalemia  Assessment & Plan  · K+ checked on admission 1/6/2022: 3 5  · Magnesium 1/5/2022 2 4  · Administer K+ supplementation: 20 mEq potassium chloride IV x 1  · Continue to monitor electrolytes and replete as necessary       VTE Prophylaxis: Enoxaparin (Lovenox)  / sequential compression device   Code Status: level 1 full code  POLST: There is no POLST form on file for this patient (pre-hospital)  Discussion with family: none at this time    Anticipated Length of Stay:  Patient will be admitted on an Inpatient basis with an anticipated length of stay of  <2  midnights  Justification for Hospital Stay: ongoing management of multi-factorial withdrawal and encephalopathy    For any questions or concerns, please Tiger Text the advanced practitioner in the role of [facility]-DETOX-AP On Call    This patient qualifies for Level IV medically managed intensive inpatient services under the criteria set by the American Society of Addiction Medicine, including dimensions 1-3  The patient is in withdrawal (or is intoxicated with high risk of withdrawal), with severe and unstable medical and/or psychiatric (dual diagnosis) problems, requiring requires 24-hour medical and nursing care and the full resources of a [address] patient to medical detox unit and continue supportive care and stabilization of acute ethanol withdrawal per medical toxicology/detox treatment pathway  Monitor ethanol withdrawal severity via the Severity of Ethanol Withdrawal Scale (SEWS) Q4 hours and then hourly if/when SEWS > 6  Treat withdrawal per pathway and reassess Q30-60 minutes  Mild SEWS Score 1-6  Administer medications* (IV or PO; PO preferred):   If initial SEWS score: diazepam 10mg PO/IV x 1 AND phenobarbital 65 mg PO/IV x 1   If repeat SEWS score 1-6: phenobarbital 65 mg PO/IV q1 hour x 5 doses maximum   Reassessment:    SEWS q1 hour after each dose until SEWS 0 x 2 hours   VS q1 hours (until SEWS 0, then q4 hours)   Notify provider for bedside evaluation if 5-dose maximum is reached, RASS of -3 to -5, or SEWS score escalates to moderate or severe     Moderate SEWS Score 7-12  Administer medications* (IV):   If initial SEWS score: diazepam 10mg IV x 1 AND phenobarbital 260 mg IV x 1   If repeat SEWS score 7-12 or score escalated from mild: phenobarbital 130 mg IV q30 minutes x 5 doses maximum   Reassessment:   SEWS q30 minutes after each dose until SEWS < 7 (then hourly until SEWS 0 x 2 hours)   VS q30 minutes until SEWS < 7 (then hourly until SEWS 0, then q4 hours)   Notify provider for bedside evaluation if 5-dose maximum is reached, RASS of -3 to -5, or SEWS score escalates to severe  Severe SEWS Score ? 13  Administer medications* (IV):   If initial SEWS score: Diazepam 10 mg IV x 1 AND phenobarbital 650 mg IV piggyback x 1 over 15-30 minutes   If repeat SEWS score ? 13 or score escalated from mild or moderate: phenobarbital 130 mg IV q30 minutes x 5 doses maximum   Reassessment:   SEWS q30 minutes after each dose until SEWS < 7 (then hourly until SEWS 0 x 2 hours)    VS q30 minutes until SEWS < 7 (then hourly until SEWS 0, then q4 hours)   Notify provider for bedside evaluation if 5-dose maximum is reached or RASS of -3 to -5   *Hold medications and notify provider if CNS depression, respirations < 10/min, or RASS of -3 to -5  Medications to be administered adjunctively if more than 2 grams of phenobarbital is needed for stabilization of withdrawal; require attending approval     Dexmedetomidine infusion 0 1-1mcg/kg/hr IV infusion, titratable to reduced agitation (Goal: RASS -2)   Ketamine   o Acute agitated delirium: 1-2 mg/kg IV or 4-5 mg/kg IM  o Refractory withdrawal: 0 1-1mg/kg/hr IV infusion, titratable to reduced agitation (Goal: RASS -2)    Further evaluation, screening and treatment:  Evaluate complete metabolic panel, transaminases, INR, and lipase  Assess hepatic ultrasound for any sign of alcoholic liver disease or cirrhosis, and ultimately refer for further hepatic evaluation and care as/if indicated  Additional medications for ethanol associated malnutrition:   Thiamine 100 mg IV daily, increase to 500 mg TID for signs/symptoms of Wernicke's Encephalopathy or Wernicke Korsakoff Syndrome   Folic acid 1 mg IV daily   Multivitamin PO daily Will offer first monthly injection of Naltrexone 380 mg IM, once patient is stabilized, as it has been shown to assist in decreasing cravings for ethanol  Evaluate and treat for coexisting substance use, such as opioids and nicotine  Discuss risk factors for infectious disease, such as history of intravenous drug abuse, and offer hepatitis and HIV screening if indicated  Case management consultation to assist with coordination of subsequent treatment after discharge  Hx and PE limited by: encephalopathy    HPI: Damaso Bronson is a 36y o  year old male PMH alcohol use disorder, opioid use disorder previously on suboxone, and benxodiazepine abuse who presents with acute encephalopathy  Patient originally presented to - ED 1/5/2022 from [facility] for evaluation of confusion, diaphoresis, tremors in setting of alcohol and opioid withdrawal  Patient was at [facility] to attempt to get help with his substance use  Patient states he currently consumes alcohol, snorts heroin/fentanyl, and use benzodiazepines (denies substance use otherwise including tobacco products, cocaine, meth); patient expresses interest in pursuing detox from all substances at this time, also states he is interested in pursuing MAT with suboxone  Denies significant PMH, denies current prescriptions  Documentation from [facility] indicates patient was admitted 1/4/2022 and is prescribed lexapro 10 mg daily  **    Denies acute complaints at this time other than being "surrounded by a bunch of strange people " Denies headaches/lightheadedness/dizziness, chest pain, SOB, N/V/D, abdominal pain       Preferred alcoholic beverage(s): beer  Quantity and frequency of alcohol intake: 12-18 12-oz beers daily   Use of any ethanol substitutes (toxic alcohols): no  Date/Time of last alcohol intake: 1/4/2022  Current signs and symptoms of ethanol withdrawal: tremor, diaphoresis, hallucinations, disorientation and delirium    SEWS Total Score: 13 (1/6/2022  2:17 AM)    Ethanol Withdrawal History  Previous ethanol withdrawal? yes  Prior inpatient treatment for ethanol withdrawal? no  Prior outpatient treatment for ethanol withdrawal? yes  History of seizures with prior ethanol withdrawal? yes  Prior treatment with naltrexone (Vivitrol)? no  Current treatment with naltrexone (Vivitrol)? no  Other current treatment for ethanol use disorder? no  Co-existing substance use? Yes  · Heroin/fentanyl  · Snorts 20 bags daily x 8-9 years   · Last use 1/3/2022  · Past prescription for suboxone, states previously [facility]  · also has used suboxone without prescription  · Most recent suboxone use 3 weeks ago   ·  denies current IVDU    · Benzodiazepines  · Xanax/ativan  · 3-4 tabs daily (uncertain of exact dose) x 5 yrs  · Last use 1/3/2022    Review of PDMP: yes   10/25/2021  2  10/25/2021  BUPRENORPHINE-NALOX 12-3MG FLM  7 0  7  JU PRO  [id number]  [facility] ([id number])  0  12 0 mg  Comm Ins  [place]    10/12/2021  2  10/12/2021  BUPRENORPHINE-NALOX 12-3MG FLM  7 0  7  JU PRO  [id number]  [facility] ([id number])  0  12 0 mg  Comm Ins  [place]          Social History     Substance and Sexual Activity   Alcohol Use Yes     Social History     Substance and Sexual Activity   Drug Use Yes    Types: Heroin, Fentanyl    Comment: last used yesterday     Social History     Tobacco Use   Smoking Status Never Smoker   Smokeless Tobacco Never Used       Review of Systems   Constitutional: Positive for appetite change  Negative for chills and fever  HENT: Negative for congestion, ear pain, rhinorrhea, sneezing and sore throat  Eyes: Negative for pain and visual disturbance  Respiratory: Negative for cough and shortness of breath  Cardiovascular: Negative for chest pain and palpitations  Gastrointestinal: Negative for abdominal pain, constipation, diarrhea, nausea and vomiting  Genitourinary: Negative for difficulty urinating, dysuria and hematuria  Musculoskeletal: Negative for arthralgias and back pain  Skin: Negative for color change and rash  Neurological: Negative for dizziness, seizures, syncope and headaches  All other systems reviewed and are negative  Historical Information   History reviewed  No pertinent past medical history  History reviewed  No pertinent surgical history  History reviewed  No pertinent family history  Social History   Marital Status: Single    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[employer])  Patient Pre-hospital Living Situation: with girlfriend   Patient Pre-hospital Level of Mobility: independent   Patient Pre-hospital Diet Restrictions: none    Allergies   Allergen Reactions    Gentamicin Other (See Comments)     Pt unsure       Prior to Admission medications    Not on File       Current Facility-Administered Medications   Medication Dose Route Frequency    cloNIDine (CATAPRES) tablet 0 1 mg  0 1 mg Oral Q6H PRN    enoxaparin (LOVENOX) subcutaneous injection 40 mg  40 mg Subcutaneous Daily    folic acid 1 mg in sodium chloride 0 9 % 50 mL IVPB  1 mg Intravenous Daily    gabapentin (NEURONTIN) capsule 300 mg  300 mg Oral Q8H PRN    multivitamin-minerals (CENTRUM) tablet 1 tablet  1 tablet Oral Daily    ondansetron (ZOFRAN) injection 4 mg  4 mg Intravenous Q6H PRN    potassium chloride 20 mEq IVPB (premix)  20 mEq Intravenous Once    sodium chloride 0 9 % infusion  125 mL/hr Intravenous Continuous    thiamine (VITAMIN B1) 500 mg in sodium chloride 0 9 % 50 mL IVPB  500 mg Intravenous Q8H [address]       Continuous Infusions:sodium chloride, 125 mL/hr, Last Rate: 125 mL/hr (01/06/22 0237)         Objective     No intake or output data in the 24 hours ending 01/06/22 0355    Invasive Devices:   Peripheral IV 01/05/22 Right Antecubital (Active)   Site Assessment WDL 01/05/22 1837   Dressing Type Transparent 01/05/22 1837   Line Status Blood return noted; Flushed;Saline locked 01/05/22 1837   Dressing Status Clean;Dry; Intact 01/05/22 1837       Vitals   Vitals:    01/06/22 0217   BP: 132/80   TempSrc: Temporal   Pulse: 80   Resp: 20   Patient Position - Orthostatic VS: Lying   Temp: 97 7 °F (36 5 °C)       Physical Exam  Vitals reviewed  Constitutional:       General: He is not in acute distress  Appearance: Normal appearance  He is ill-appearing and diaphoretic  HENT:      Head: Normocephalic and atraumatic  Nose: Nose normal  No congestion  Mouth/Throat:      Mouth: Mucous membranes are moist       Pharynx: Oropharynx is clear  Eyes:      General: No scleral icterus  Extraocular Movements: Extraocular movements intact  Right eye: Nystagmus present  Left eye: Nystagmus present  Conjunctiva/sclera: Conjunctivae normal       Pupils: Pupils are equal, round, and reactive to light  Cardiovascular:      Rate and Rhythm: Normal rate and regular rhythm  Pulses: Normal pulses  Heart sounds: Normal heart sounds  No murmur heard  No friction rub  No gallop  Pulmonary:      Effort: Pulmonary effort is normal  No respiratory distress  Breath sounds: Normal breath sounds  No wheezing, rhonchi or rales  Abdominal:      General: Abdomen is flat  Bowel sounds are normal  There is no distension  Palpations: Abdomen is soft  Tenderness: There is no abdominal tenderness  There is no guarding  Musculoskeletal:         General: No swelling  Normal range of motion  Cervical back: Normal range of motion  Right lower leg: No edema  Left lower leg: No edema  Skin:     General: Skin is warm  Capillary Refill: Capillary refill takes less than 2 seconds  Comments: Smooth, no piloerection   Neurological:      General: No focal deficit present  Mental Status: He is lethargic and disoriented  Cranial Nerves: No facial asymmetry  Sensory: No sensory deficit  Motor: Tremor present  Comments: Orientated to person only  Alertness appears to fluctuate during encounter from non-verbal to agitated and confused to coherent   Psychiatric:         Attention and Perception: He is inattentive (occasionally requires frequent prompting to respond to questioning )  He perceives auditory and visual hallucinations  Mood and Affect: Mood is depressed  Affect is flat  Behavior: Behavior is slowed  Behavior is cooperative  Cognition and Memory: Cognition is impaired  Data:    EKG, Pathology, and Other Studies: I have personally reviewed pertinent reports  · EKG (performed in ED 1/5/2022, as documented by Dr Josselyn Padilla): "sinus rhythm  LAFB   ST segments normal "  · CT head 1/5/2022: "no acute intracranial abnormality"    Lab Results:  CBC ETOH     Lab Results   Component Value Date    WBC 9 84 01/05/2022    RBC 5 21 01/05/2022    HGB 15 3 01/05/2022    HCT 45 9 01/05/2022    MCV 88 01/05/2022    MCH 29 4 01/05/2022    MCHC 33 3 01/05/2022    RDW 12 9 01/05/2022     (H) 01/05/2022    MPV 9 7 01/05/2022      No results found for: LACTICACID   CMP UA         Component Value Date/Time    K 3 5 (L) 01/06/2022 0234     01/05/2022 1840    CO2 28 01/05/2022 1840    BUN 10 01/05/2022 1840    CREATININE 0 37 (L) 01/05/2022 1840         Component Value Date/Time    CALCIUM 9 2 01/05/2022 1840    ALKPHOS 65 01/05/2022 1840    AST 43 01/05/2022 1840    ALT 21 01/05/2022 1840      No results found for: CLARITYU, COLORU, SPECGRAV, PHUR, GLUCOSEU, KETONESU, BLOODU, PROTEIN UA, NITRITE, BILIRUBINUR, UROBILINOGEN, LEUKOCYTESUR, WBCUA, RBCUA, HYALINE, BACTERIA, EPIS     Liver Function Test: ASA     Lab Results   Component Value Date    TBILI 0 70 01/05/2022    ALKPHOS 65 01/05/2022    AST 43 01/05/2022    ALT 21 01/05/2022    TP 7 6 01/05/2022    ALB 4 0 01/05/2022      Lab Results   Component Value Date    SALICYLATE <3 (L) 65/01/1289      Troponin APAP     No results found for: TROPONINI   Lab Results   Component Value Date    ACTMNPHEN <2 0 (L) 01/05/2022      VBG HCG     No results found for: PHVEN, WRO4RQJ, PO2VEN, WRH9WLN, BEVEN, F5OFHYTMA, Q5LREVF   No results found for: HCGQUANT   ABG Urine Drug Screen     No results found for: PHART, GJH0SLJ, PO2ART, KIS9KYJ, BEART, A1SUBQHVI, O2HGB, SOURC, GERONIMO, VTAC, ACRATE, INSPIREDAIR, PEEP   No results found for: AMPMETHUR, BARBTUR, BDZUR, COCAINEUR, METHADONEUR, OPIATEUR, PCPUR, THCUR, OXYCODONEUR   Lactate INR     No results found for: LACTICACID   No results found for: INR   PTT Protime     No results found for: PTT     No results found for: PROTIME       Imaging Studies: I have personally reviewed pertinent reports  Counseling / Coordination of Care  Total floor / unit time spent today 70 minutes  Greater than 50% of total time was spent with the patient and / or family counseling and / or coordination of care  Minutes of critical care time 30  -Critical care time was exclusive of separately billable procedures and teaching time    -Critical care was necessary to treat or prevent imminent or life-threatening deterioration of the following condition: CNS failure/compromise, toxidrome (ethanol withdrawal),  withdrawal and CNS failure/compromise  -Critical care time was spent personally by me on the following activities as well as the above as per the course and rest of chart: obtaining history from patient/surrogate, development of a treatment plan, discussions with referring provider(s), evaluation of patient's response to the treatment, examination of the patient, recommending treatments and interventions, re-evaluation of the patient's condition, review of old charts, ordering/interpreting laboratory studies, ordering/interpreting of radiographic studies  ** Please Note: This note has been constructed using a voice recognition system   **

## 2022-01-06 NOTE — CASE MANAGEMENT
Cm attempted to meet with pt to complete intake  Pt presented as sedated and could not respond to questions

## 2022-01-06 NOTE — ASSESSMENT & PLAN NOTE
· K+ checked on admission 1/6/2022: 3 5  · Magnesium 1/5/2022 2 4  · Administer K+ supplementation: 40 mEq potassium chloride PO x 1  · Continue to monitor electrolytes and replete as necessary

## 2022-01-06 NOTE — ASSESSMENT & PLAN NOTE
· Snorts heroin/fentanyl: 20 bags daily    · Last use: 1/3/2022  · Previously prescribed suboxone in the past  · Per PDMP- last script written/filled 10/25/2021  · Patient also admits to using suboxone without prescription  · States last use 3 weeks ago   · 1/6/22 - Patient started on micro-induction of 8 mg of suboxone  · 1/7/22- Patient transitoned maintenance therapy     · Patient currently denies any withdrawal symptoms  · Will follow up with [facility]   ·     ·  Follow COWS protocol for medically assisted opioid withdrawal   · Plan to initiate MAT protocol once stable from alcohol withdrawal

## 2022-01-06 NOTE — ASSESSMENT & PLAN NOTE
· Snorts heroin/fentanyl: 20 bags daily    · Last use: 1/3/2022  · Previously prescribed suboxone in the past  · Per PDMP- last script written/filled 10/25/2021  · Patient also admits to using suboxone without prescription  · States last use 3 weeks ago   · Follow COWS protocol for medically assisted opioid withdrawal   · Plan to initiate MAT protocol once stable from alcohol withdrawal

## 2022-01-07 LAB
ANION GAP SERPL CALCULATED.3IONS-SCNC: 5 MMOL/L (ref 5–14)
BUN SERPL-MCNC: 3 MG/DL (ref 5–25)
CALCIUM SERPL-MCNC: 8 MG/DL (ref 8.4–10.2)
CHLORIDE SERPL-SCNC: 103 MMOL/L (ref 97–108)
CO2 SERPL-SCNC: 26 MMOL/L (ref 22–30)
CREAT SERPL-MCNC: 0.56 MG/DL (ref 0.7–1.5)
ERYTHROCYTE [DISTWIDTH] IN BLOOD BY AUTOMATED COUNT: 13.5 %
GFR SERPL CREATININE-BSD FRML MDRD: 139 ML/MIN/1.73SQ M
GLUCOSE SERPL-MCNC: 162 MG/DL (ref 70–99)
HCT VFR BLD AUTO: 38.7 % (ref 41–53)
HGB BLD-MCNC: 13.5 G/DL (ref 13.5–17.5)
MAGNESIUM SERPL-MCNC: 2 MG/DL (ref 1.6–2.3)
MCH RBC QN AUTO: 30.8 PG (ref 26–34)
MCHC RBC AUTO-ENTMCNC: 34.9 G/DL (ref 31–36)
MCV RBC AUTO: 88 FL (ref 80–100)
PLATELET # BLD AUTO: 317 THOUSANDS/UL (ref 150–450)
PMV BLD AUTO: 8.4 FL (ref 8.9–12.7)
POTASSIUM SERPL-SCNC: 3.3 MMOL/L (ref 3.6–5)
RBC # BLD AUTO: 4.38 MILLION/UL (ref 4.5–5.9)
SARS-COV-2 RNA RESP QL NAA+PROBE: NEGATIVE
SODIUM SERPL-SCNC: 134 MMOL/L (ref 137–147)
WBC # BLD AUTO: 10.9 THOUSAND/UL (ref 4.5–11)

## 2022-01-07 PROCEDURE — 80048 BASIC METABOLIC PNL TOTAL CA: CPT | Performed by: PHYSICIAN ASSISTANT

## 2022-01-07 PROCEDURE — 83735 ASSAY OF MAGNESIUM: CPT | Performed by: PHYSICIAN ASSISTANT

## 2022-01-07 PROCEDURE — 99232 SBSQ HOSP IP/OBS MODERATE 35: CPT

## 2022-01-07 PROCEDURE — 99239 HOSP IP/OBS DSCHRG MGMT >30: CPT

## 2022-01-07 PROCEDURE — 85027 COMPLETE CBC AUTOMATED: CPT | Performed by: PHYSICIAN ASSISTANT

## 2022-01-07 RX ORDER — POTASSIUM CHLORIDE 20 MEQ/1
20 TABLET, EXTENDED RELEASE ORAL ONCE
Status: COMPLETED | OUTPATIENT
Start: 2022-01-07 | End: 2022-01-07

## 2022-01-07 RX ORDER — LANOLIN ALCOHOL/MO/W.PET/CERES
3 CREAM (GRAM) TOPICAL
Status: DISCONTINUED | OUTPATIENT
Start: 2022-01-07 | End: 2022-01-08 | Stop reason: HOSPADM

## 2022-01-07 RX ORDER — PHENOBARBITAL 64.8 MG/1
64.8 TABLET ORAL ONCE
Status: COMPLETED | OUTPATIENT
Start: 2022-01-07 | End: 2022-01-07

## 2022-01-07 RX ADMIN — GABAPENTIN 300 MG: 300 CAPSULE ORAL at 19:30

## 2022-01-07 RX ADMIN — SODIUM CHLORIDE 125 ML/HR: 0.9 INJECTION, SOLUTION INTRAVENOUS at 04:59

## 2022-01-07 RX ADMIN — ENOXAPARIN SODIUM 40 MG: 40 INJECTION SUBCUTANEOUS at 08:36

## 2022-01-07 RX ADMIN — THIAMINE HYDROCHLORIDE 500 MG: 100 INJECTION, SOLUTION INTRAMUSCULAR; INTRAVENOUS at 14:30

## 2022-01-07 RX ADMIN — THIAMINE HYDROCHLORIDE 500 MG: 100 INJECTION, SOLUTION INTRAMUSCULAR; INTRAVENOUS at 05:00

## 2022-01-07 RX ADMIN — CLONIDINE HYDROCHLORIDE 0.1 MG: 0.1 TABLET ORAL at 18:21

## 2022-01-07 RX ADMIN — FOLIC ACID 1 MG: 5 INJECTION, SOLUTION INTRAMUSCULAR; INTRAVENOUS; SUBCUTANEOUS at 08:36

## 2022-01-07 RX ADMIN — BUPRENORPHINE AND NALOXONE 8 MG: 8; 2 FILM BUCCAL; SUBLINGUAL at 08:36

## 2022-01-07 RX ADMIN — THIAMINE HYDROCHLORIDE 500 MG: 100 INJECTION, SOLUTION INTRAMUSCULAR; INTRAVENOUS at 21:08

## 2022-01-07 RX ADMIN — MELATONIN TAB 3 MG 3 MG: 3 TAB at 22:30

## 2022-01-07 RX ADMIN — ESCITALOPRAM OXALATE 10 MG: 10 TABLET ORAL at 08:37

## 2022-01-07 RX ADMIN — MULTIPLE VITAMINS W/ MINERALS TAB 1 TABLET: TAB ORAL at 08:37

## 2022-01-07 RX ADMIN — PHENOBARBITAL 64.8 MG: 64.8 TABLET ORAL at 05:35

## 2022-01-07 RX ADMIN — POTASSIUM CHLORIDE 20 MEQ: 1500 TABLET, EXTENDED RELEASE ORAL at 11:01

## 2022-01-07 RX ADMIN — BUPRENORPHINE AND NALOXONE 8 MG: 8; 2 FILM BUCCAL; SUBLINGUAL at 21:08

## 2022-01-07 NOTE — NURSING NOTE
Patient alert and awake at this time  He is alert and oriented and does not remember his behaviors from yesterday but was apologetic with staff  Patient scored on SEWS as he stated he was anxious and had a tremor  Linens changed and patient ambulated to the bathroom with no issues  Patient reports he uses alcohol, benzo, and opiates but does not endorse meth use

## 2022-01-07 NOTE — UTILIZATION REVIEW
Initial Clinical Review    Pt initially presented to [facility] ED  Pt was transferred by EMS to [address] [place] [address] for a higher level of care in medical detox unit      [facility] ED Treatment:   Medication Administration from 01/05/2022 1647 to 01/07/2022 1332    Date/Time Order Dose Route Action   01/05/2022 1853 sodium chloride 0 9 % bolus 1,000 mL 1,000 mL Intravenous New Bag   01/05/2022 1924 LORazepam (ATIVAN) injection 2 mg 2 mg Intravenous Given   01/05/2022 2041 LORazepam (ATIVAN) injection 4 mg 4 mg Intravenous Given   01/05/2022 2210 LORazepam (ATIVAN) injection 4 mg 4 mg Intravenous Given   01/05/2022 2320 haloperidol lactate (HALDOL) injection 5 mg 5 mg Intramuscular Given   01/06/2022 0109 LORazepam (ATIVAN) injection 4 mg 4 mg Intravenous Given     Date/Time Temp Pulse Resp BP MAP (mmHg) SpO2 O2 Device   01/06/22 0100 -- 71 20 129/94 108 98 % None (Room air)   01/06/22 0030 -- 70 20 130/81 99 -- None (Room air)   01/06/22 0015 -- 80 -- 130/95 -- -- --   01/06/22 0000 -- 90 20 140/79 104 -- --   01/05/22 2331 -- -- -- -- -- -- None (Room air)   01/05/22 2330 -- 82 20 124/76 95 98 % None (Room air)   01/05/22 2315 -- 82 -- 141/79 -- -- --   01/05/22 2300 -- 101 20 128/96 108 -- --   01/05/22 2226 -- 79 -- 134/85 -- -- --   01/05/22 2200 -- 104 20 127/91 103 99 % --   01/05/22 2130 -- 86 -- 114/77 -- -- --   01/05/22 2100 -- 94 -- 130/83 -- -- --   01/05/22 2032 -- 93 20 131/83 102 97 % None (Room air)   01/05/22 1900 -- 90 -- 123/85 -- 98 % --   01/05/22 1835 -- 98 -- 121/80 96 98 % --   01/05/22 1719 99 1 °F (37 3 °C) 119 Abnormal  20 129/75 -- 97 % None (Room air)         Admission: Date/Time/Statement:   Admission Orders (From admission, onward)     Ordered        01/06/22 0214  Inpatient Admission  Once                      Orders Placed This Encounter   Procedures    Inpatient Admission     Standing Status:   Standing     Number of Occurrences:   1     Order Specific Question:   Level of Care     Answer:   Med Surg [16]     Order Specific Question:   Estimated length of stay     Answer:   More than 2 Midnights     Order Specific Question:   Certification     Answer:   I certify that inpatient services are medically necessary for this patient for a duration of greater than two midnights  See H&P and MD Progress Notes for additional information about the patient's course of treatment  Chief Complaint   Patient presents with    Altered Mental Status     started today while at [facility]; oriented to person, not place/time       Initial Presentation: 36 y o  male who initially presented self to [address]  [facility] ED, was subsequently transferred by EMS to [address] [place] [address]  Inpatient admission for evaluation and treatment of Opioid use disorder, Benzodiazepine abuse, Alcohol use disorder, and Alcohol withdrawal syndrome  Presented after seeking help w/ detox from [facility] where he became weak, confused, diaphoretic, & tremulous  Pt has been on suboxone previously  Currently consumes alcohol, snorts heroin/fentanyl, and uses benzodiazepines  Pt drinks 12-18 twelve ounce beers daily; last drink 1/4  Snorts 20 bags of heroin/fentanyl daily for 8-9 years; last use 1/3  Uses 3-4 tabs of unknown dosage pf Xanax/Ativan daily for past 5 years; last use 1/3  SEWS 13  On exam, tremors, diaphoresis, hallucinations, disorientation, delirium, ill-appearing, nystagmus, lethargic, oriented only to self, fluctuating alertness, inattentive, depressed mood, flat affect, slowed behavior  Plan continuous observation for pt safety, SEWS monitoring w/ phenobarbital management, IVF, high-dose thiamine, folic acid supplementation, b/l soft wrist restraints  Trend lab, replete electrolytes as needed  Date: 01/07/22   Day 2: Pt began exhibiting symptoms of opioid withdrawal overnight and Suboxone micro-induction was started   Pt reports not being able to remember events over past day  Reports feeling better, but with continued body aches  On exam, pupils 4 5mm & sluggish, withdrawn behavior, lethargic but arousable, heart murmur, b/l edema in feet  SEWS 0  Plan: continue thiamine/folic acid supplementation  Trend lab, replete electrolytes as needed  Admission Vitals  01/06/22 0217   Temperature Pulse Respirations Blood Pressure SpO2   97 7 °F (36 5 °C) 80 20 132/80 95 %      Wt Readings from Last 1 Encounters:   01/07/22 79 4 kg (175 lb)     Additional Vital Signs:  Date/Time Temp Pulse Resp BP SpO2 O2 Device   01/07/22 1113 -- 80 -- 133/78 95 % None (Room air)   01/07/22 0729 98 °F (36 7 °C) 78 16 126/70 97 % None (Room air)   01/07/22 0528 97 5 °F (36 4 °C) 86 16 127/76 96 % None (Room air)   01/06/22 2300 98 8 °F (37 1 °C) 80 16 103/65 97 % None (Room air)   01/06/22 2011 98 1 °F (36 7 °C) 72 16 114/72 98 % None (Room air)   01/06/22 1500 98 2 °F (36 8 °C) 96 16 108/70 96 % None (Room air)   01/06/22 1300 98 7 °F (37 1 °C) 106 Abnormal  -- 139/77 99 % None (Room air)   01/06/22 1159 98 2 °F (36 8 °C) 99 16 132/84 98 % None (Room air)   01/06/22 1113 98 1 °F (36 7 °C) 96 -- 136/83 96 % None (Room air)   01/06/22 1023 97 6 °F (36 4 °C) 90 16 131/78 98 % None (Room air)   01/06/22 0900 98 3 °F (36 8 °C) 90 18 131/81 99 % None (Room air)   01/06/22 0722 97 9 °F (36 6 °C) 84 18 126/73 97 % None (Room air)   01/06/22 0435 97 8 °F (36 6 °C) 83 18 121/75 96 % None (Room air)     Severity of Ethanol Withdrawal Scale (SEWS):  Row Name 01/07/22 0800 01/07/22 0530 01/06/22 2043 01/06/22 1317 01/06/22 1209      ANXIETY: Do you feel that something bad is about to happen to you right now?  0 3 0 3 3   NAUSEA and DRY HEAVES or VOMITING? 0 0 0 0 0   SWEATING: (includes moist palms, sweating now)?  Score 0 or 2 0 0 0 2 2   TREMOR: with arms extended eyes closed?  0 2 0 0 0   AGITATION: Fidgety, restless, pacing? 0 0 0 3 3   DISORIENTATION: 0 0 0 1 0   HALLUCINATIONS: 0 0 0 0 0   VITAL SIGNS: ANY (Pulse >212, Diastolic BP >84, Temp >91 4) 0 0 0 0 0   SEWS Total Score 0 5 0 9 8   Soria Agitation Sedation Scale (RASS) -1 -- -- +1 +1            01/06/22 1115 01/06/22 1025 01/06/22 0941 01/06/22 0500 01/06/22 0217   ANXIETY: Do you feel that something bad is about to happen to you right now? 3 3 3 0 0   NAUSEA and DRY HEAVES or VOMITING? 0 0 3 0 0   SWEATING: (includes moist palms, sweating now)? Score 0 or 2 2 0 2 0 2   TREMOR: with arms extended eyes closed?  0 0 2 0 2   AGITATION: Fidgety, restless, pacing? 3 0 0 0 3   DISORIENTATION: 0 0 0 0 3   HALLUCINATIONS: 0 0 0 0 3   VITAL SIGNS: ANY (Pulse >459, Diastolic BP >33, Temp >22 6) 0 0 0 0 0   SEWS Total Score 8 3 10 0 13   Soria Agitation Sedation Scale (RASS) +1 0 0 -2 --       Pertinent Labs/Diagnostic Test Results:   1/5 - CT Head  No acute intracranial abnormality      1/5 - EKG  Normal sinus rhythm  Left anterior fascicular block    Results from last 7 days   Lab Units 01/07/22  0507 01/06/22  0436 01/05/22  1840   WBC Thousand/uL 10 90 9 90 9 84   HEMOGLOBIN g/dL 13 5 14 1 15 3   HEMATOCRIT % 38 7* 41 6 45 9   PLATELETS Thousands/uL 317 343 428*   NEUTROS ABS Thousands/µL  --  6 60 6 45     Results from last 7 days   Lab Units 01/07/22  0507 01/06/22  0436 01/06/22  0234 01/05/22  1845 01/05/22  1840   SODIUM mmol/L 134* 138  --   --  136   POTASSIUM mmol/L 3 3* 3 7 3 5*  --  5 4*   CHLORIDE mmol/L 103 107  --   --  102   CO2 mmol/L 26 27  --   --  28   ANION GAP mmol/L 5 4*  --   --  6   BUN mg/dL 3* 8  --   --  10   CREATININE mg/dL 0 56* 0 52*  --   --  0 37*   EGFR ml/min/1 73sq m 139 144  --   --  165   CALCIUM mg/dL 8 0* 8 6  --   --  9 2   MAGNESIUM mg/dL 2 0 2 1  --  2 4  --    PHOSPHORUS mg/dL  --  3 2  --   --   --      Results from last 7 days   Lab Units 01/06/22  0436 01/05/22  1840   AST U/L 19 43   ALT U/L 18 21   ALK PHOS U/L 53 65   TOTAL PROTEIN g/dL 7 0 7 6   ALBUMIN g/dL 4 0 4 0   TOTAL BILIRUBIN mg/dL 0 63 0 70 Results from last 7 days   Lab Units 01/05/22  1853   POC GLUCOSE mg/dl 95     Results from last 7 days   Lab Units 01/07/22  0507 01/06/22  0436 01/05/22  1840   GLUCOSE RANDOM mg/dL 162* 110* 96     Results from last 7 days   Lab Units 01/06/22  0436 01/06/22  0234 01/06/22  0007   HS TNI 0HR ng/L  --  2 <2   HS TNI 2HR ng/L <2  --   --    HSTNI D2 ng/L <0  --   --      Results from last 7 days   Lab Units 01/05/22  1840   ETHANOL LVL mg/dL <3   ACETAMINOPHEN LVL ug/mL <2 9*   SALICYLATE LVL mg/dL <3*       Present on Admission:   Alcohol withdrawal syndrome with complication (HCC)   Alcohol use disorder, severe, dependence (HCC)   Benzodiazepine abuse ([facility] )   Opioid use disorder, severe, dependence ([place] [address] )   Acute encephalopathy   Hypokalemia   Amphetamine withdrawal with delirium ([place] [address] )      Admitting Diagnosis: Alcohol withdrawal ([place] [address] ) [F10 239]  Age/Sex: 36 y o  male  Admission Orders:  Regular Diet  SEWS monitoring  Fall & Seizure precautions  Continuous pulse ox  SCDs  Scheduled Medications:  buprenorphine-naloxone, 8 mg, Sublingual, BID  enoxaparin, 40 mg, Subcutaneous, Daily  escitalopram, 10 mg, Oral, Daily  folic acid IVPB, 1 mg, Intravenous, Daily  multivitamin-minerals, 1 tablet, Oral, Daily  thiamine, 500 mg, Intravenous, Q8H [address]    Continuous IV Infusions:  sodium chloride, 125 mL/hr, Intravenous, Continuous    PRN Meds:  cloNIDine, 0 1 mg, Oral, Q6H PRN  gabapentin, 300 mg, Oral, Q8H PRN  ondansetron, 4 mg, Intravenous, Q6H PRN; 1/6 x1    Discontinued Meds:  buprenorphine-naloxone, 8 mg, Sublingual, 1/6 x1  PHENobarbital, 130 mg, IV, 1/6 x4  PHENobarbital, 650 mg, IV, 1/6 x1  PHENobarbital, 64 8 mg, PO, 1/6 x1, 1/7 x1  potassium chloride, 20 mEq, IV, 1/6 x1      IP CONSULT TO CASE MANAGEMENT    Network Utilization Review Department  ATTENTION: Please call with any questions or concerns to [phone] and carefully listen to the prompts so that you are directed to the right person  All voicemails are confidential   Banda Felicia all requests for admission clinical reviews, approved or denied determinations and any other requests to dedicated fax number below belonging to the campus where the patient is receiving treatment   List of dedicated fax numbers for the Facilities:  [address] DENIALS (Administrative/Medical Necessity) [phone]   [address] (Maternity/NICU/Pediatrics) [phone]   [address]  [address] Medical [place] [address] [address] [place] [address] [address] [facility] [place] [address] [phone]

## 2022-01-07 NOTE — UTILIZATION REVIEW
Inpatient Admission Authorization Request   NOTIFICATION OF INPATIENT ADMISSION/INPATIENT AUTHORIZATION REQUEST   SERVICING FACILITY:   [address] [place] [address]  [address] , [facility] [place], [address] [place] [address]  Tax ID: [id number]  NPI: [id number]  Place of Service: Inpatient [facility]  [address]  Place of Service Code: 24     ATTENDING PROVIDER:  Attending Name and NPI#: Estefanía Aretaga [[id number]]  Address: [address] , [facility] [place], [address] [place] [address]  Phone: [phone]     UTILIZATION REVIEW CONTACT:  Adalberto Torres, Utilization   Network Utilization Review Department  Phone: [phone]  Fax [phone]  Email: [email or web address]     PHYSICIAN ADVISORY SERVICES:  FOR XOCQ-LG-[id number] REVIEW - MEDICAL NECESSITY DENIAL  Phone: [phone]  Fax: [phone]  Email: [email or web address]     TYPE OF REQUEST:  Inpatient Status     ADMISSION INFORMATION:  ADMISSION DATE/TIME: 1/6/22  1:52 AM  PATIENT DIAGNOSIS CODE/DESCRIPTION:  Alcohol withdrawal ([facility] ) [F10 239]  DISCHARGE DATE/TIME: No discharge date for patient encounter  DISCHARGE DISPOSITION (IF DISCHARGED): [address] [facility]     IMPORTANT INFORMATION:  Please contact the Adalberto Tripp directly with any questions or concerns regarding this request  Department voicemails are confidential     Send requests for admission clinical reviews, concurrent reviews, approvals, and administrative denials due to lack of clinical to fax [phone]

## 2022-01-07 NOTE — SOCIAL WORK
MSW intern attempted to meet with patient this evening to complete psychosocial detox intake, however, patient still presenting sedated and unable to answer questions at this time  Will continue to monitor and follow up

## 2022-01-07 NOTE — DISCHARGE SUMMARY
[facility]  Discharge- Lore Braga 1990, 36 y o  male MRN: [id number]  Unit/Bed#: 5T DETOX 230-99 Encounter: [id number]  Primary Care Provider: Yee Araujo   Date and time admitted to hospital: 1/6/2022  1:52 AM    MEDICAL DETOX UNIT, LEVEL 4  Department of Medical Toxicology  Reason for Admission/Principal Problem: alcohol withdrawal, alcohol use disorder, opioid withdrawal, opioid use disorder  Admitting provider: ENMANUEL Sesay DO   1/6/2022  1:52 AM       Discharging Physician / Practitioner: Segundo Xiao PA-C  PCP: Yee Araujo  Admission Date:   Admission Orders (From admission, onward)     Ordered        01/06/22 0214  Inpatient Admission  Once                      Discharge Date: 01/08/22    Medical Problems             Resolved Problems  Date Reviewed: 1/6/2022    None                Alcohol withdrawal syndrome with complication [facility])  Assessment & Plan  H/o chronic alcohol use; positive h/o withdrawal seizure  · Last drink 1/4/2022  Ethanol  1/5/2022 18:40pm: <3   · SEWS protocol discontinued on 1/6/22   Patient received a total of 1139 mg without complication  Will continue thiamine supplementation daily    Opioid use disorder, severe, dependence ([facility] [address] )  Assessment & Plan  · Snorts heroin/fentanyl: 20 bags daily    · Last use: 1/3/2022  · Previously prescribed suboxone in the past  · Per PDMP- last script written/filled 10/25/2021  · Patient also admits to using suboxone without prescription  · States last use 3 weeks ago   · 1/6/22 - Patient started on micro-induction of 8 mg of suboxone  · 1/7/22- Patient transitoned maintenance therapy     · Patient currently denies any withdrawal symptoms  · Will follow up with [facility]     ·  Follow COWS protocol for medically assisted opioid withdrawal   · Plan to initiate MAT protocol once stable from alcohol withdrawal    Alcohol use disorder, severe, dependence ([facility] [address] )  Assessment & Plan  Patient with h/o chronic alcohol use  Per patient- currently consumes 12-18 12-oz cans of beer daily   · Last drink 1/4/2022  · Patient will be discharged to [address] [place] [address]  · Patient originally presented to [facility] ED 1/5/2022- sent from [facility] for evaluation of confusion, diaphoresis, hallucinations   · Patient received 14 mg total ativan along with 5 mg haldol in ED PTA  · Arrives in 2 point soft restraints  · PTA work-up: urine sample unable to be obtained PTA*  · CT head 1/5/2022: "no acute intracranial abnormality"  · Coma panel 1/5/2022: acetaminophen level <2, salicylate level <3, ethanol level <3  · CBC 1/5/2022: WBCs 9 84, ANC 6 45  · CMP 1/5/2022: sodium 136, K+ 5 4, Anion gap 6  · Potassium 1/6/2022: 3 5  · Magnesium 2 4  · Currently- Patient oriented to person only  He denies any confusion or hallucinations  Patient is able to follow direction  Patient states that he does not remember previous episodes of delirium  · Upon re-assessment patient had episodes of somnolence  Pupils sluggish  · Continue thiamine supplementation     Hypokalemia  Assessment & Plan  · K+ checked on admission 1/6/2022: 3 5  · K + 3 3  · Replaced with 40 mEq of potassium chloride   · Replaced morning labs      Benzodiazepine abuse (HCC)  Assessment & Plan  · Reports daily use of benzodiazepines x 5 yrs (xanax and ativan)  · 3-4 "tabs" daily (uncertain of exact dose)  Reports last use 1/3/2022  · Discontinue SEWS  Patient received a total of 4177 mg without complication  Consultations During Hospital Stay:  · None    Procedures Performed:   · None     Significant Findings / Test Results:   · Hypokalemia    Incidental Findings:   · None    Test Results Pending at Discharge (will require follow up):    · None     Outpatient Tests/ Follow Up Requested:  · Follow up with PCP  · Follow up with [facility]    Complications:  None     Reason for Admission: alcohol withdrawal, alcohol use disorder     Hospital Course:     Abbey Martinez is a 36 y o  male patient who originally presented to the hospital on 1/6/2022 due to acute encephalopathy  Patient originally presented to [facility] ED for confusion, diaphoresis, tremors, and hallucinations in the setting of alcohol and opioid withdrawal  Upon admission patient was started on SEWS protocol and received high dose thiamine supplementation due to acute encephalopathy  Patient was given a total of 1300 mg of phenobarbital without complication  Patient was then transitioned to COWS protocol due to symptoms of opioid withdrawal  Patient received 8 mg of suboxone during induction and transitioned to maintenance on 1/07/22  Patient's encephalopathy appeared to be related to amphetamine induced delirium and has further improved  Patient is alert and oriented to person, place, and time  Cognitive symptoms have improved  Please see above list of diagnoses and related plan for additional information  Condition at Discharge: stable     Discharge Day Visit / Exam:     Subjective:  Patient was seen and examined at bedside  He is alert and offered no new complaints at this time  Patient states that he is excited for discharge  Vitals: Blood Pressure: 98/67 (01/08/22 0706)  Pulse: 72 (01/08/22 0706)  Temperature: 98 1 °F (36 7 °C) (01/08/22 0706)  Temp Source: Temporal (01/08/22 0706)  Respirations: 18 (01/08/22 0706)  Height: 6' 3" (190 5 cm) (01/07/22 0736)  Weight - Scale: 79 4 kg (175 lb) (01/07/22 0736)  SpO2: 97 % (01/08/22 0706)  Exam:   Physical Exam  Constitutional:       Appearance: He is not ill-appearing  Cardiovascular:      Rate and Rhythm: Normal rate and regular rhythm  Heart sounds: No murmur heard  Pulmonary:      Breath sounds: Normal breath sounds  Abdominal:      General: Bowel sounds are normal  There is no distension  Palpations: Abdomen is soft  Skin:     General: Skin is warm  Neurological:      Mental Status: He is oriented to person, place, and time  Discussion with Family:  I personally did not discuss this patient with family  Discharge instructions/Information to patient and family:   See after visit summary for information provided to patient and family  Provisions for Follow-Up Care:  See after visit summary for information related to follow-up care and any pertinent home health orders  Disposition:     Home    For Discharges to [facility] SNF:   · Not Applicable to this Patient - Not Applicable to this Patient    Planned Readmission: none      Discharge Statement:  I spent 35 minutes discharging the patient  This time was spent on the day of discharge  I had direct contact with the patient on the day of discharge  Greater than 50% of the total time was spent examining patient, answering all patient questions, arranging and discussing plan of care with patient as well as directly providing post-discharge instructions  Additional time then spent on discharge activities  Discharge Medications:  See after visit summary for reconciled discharge medications provided to patient and family        ** Please Note: This note has been constructed using a voice recognition system **

## 2022-01-07 NOTE — PROGRESS NOTES
01/07/22 0907   Referral Data   Referral Name [facility]   Referral Reason Drug/Alcohol [address]   Readmission Root Cause   30 Day Readmission No   Patient Information   Mental Status Alert   Primary Caregiver Self   Legal Information   Legal Issues pt denies   Health Care Proxy Appointed No   Activities of Daily Living Prior to Admission   Functional Status Independent   Assistive Device No device needed   Living Arrangement House   Ambulation Independent   Access to Firearms   Access to Firearms No  (pt denies)   Income Information   Income Source Unemployed   Means of Transportation   Means of Transport to [facility]: Family transport     Pt is a 36year old single male who was admitted to detox for opioid, alcohol, and benzodiazapine withdrawal  [facility] after entering rehab and presenting with distressful symptoms, hallucinations and bizarre behavior  Pt's name, date of birth, home address, and telephone number were verified  Pt was informed of case management role and the purpose of the completion of intake with case management  Pt presented as cooperative and engaged during intake  Pt reports daily alcohol use of 12-18 12 oz beers daily  Pt reports first use at age 15 and last use 1/4/2022 of 12 beers  Pt reports daily heroin use since 11/27/2021 of 20 bags snorting  Pt reports first use at age 23 and last use 1/4/2022 of 5 bags  Pt reports daily benzo, xanax/ativan, 3-4 bars daily for the last 6 weeks  Pt reports first use at age 18 and last use 1/3/2022 of 2 bars  Pt reports different periods of abstinence ranging from 1 year to multiple of years for each substance but indicates he has never stopped all substances and will generally stop one substance and start another   Pt reports 4 previous inpatient treatments, currently [facility], previous outpatient treatments, including suboxone MAT, and 12 step meeting attendance  Pt reports current withdrawal symptoms of tremors, hot and cold flashes, and nausea  Pt has a history of withdrawal seizures and hallucinations  Pt reports a family history of addiction, maternal uncle  AUDIT: 32  PAWSS: 6  Ethanol lvl in ED: <3  UDS: not completed    Pt denied any current mental health and any current mental health treatment  Pt reports as a child having a diagnosis of ADHD but denied any current treatment  Pt had been displaying hallucinations and delusions upon admission but this was most likely due to his withdrawal  Pt denied current SI or HI, denied AH/VH  Pt denied any access to firearms  Pt reports family history of mental health, father, brother, and maternal uncle  Pt denied any current medical issues  Pt signed an LITA for Joon Orozco PCP  This office will be contacted if pt requires medical follow up  Pt has current medical insurance of [employer] and no listed preferred pharmacy  Pt reports he is currently unemployed, losing his employment in November  Pt reports he has a high school diploma  Pt report he does not have a license or car and relies on family and [employer] for transportation  Pt denied any  service  Pt reports he resides with his girlfriend  Pt signed an LITA for Anastacio Hebert, mother, and she was contacted at [phone]  A message was left requesting a return call  Pt and Cm completed relapse prevention plan  Pt and Cm signed plan and pt received a copy of this plan  Pt reports he wants to return to [address] [place] [address] upon completion of detox and recognizes a need to return to recovery  Pt states he recognizes he needs to attend 12 step meetings and use his coping skills rather than substituting other substances  Pt signed an LITA for [address] [place] [address] and pt will be returned to rehab upon completion of detox  Pt presents in the contemplation stage of change

## 2022-01-07 NOTE — DISCHARGE INSTR - OTHER ORDERS
[facility] Drug and Alcohol Resources     If you have health insurance, including medical assistance, there should be a phone number on your insurance card that you can call to find out how to access services  The card may say, For Mayra Footenn Jason or For Drug and Alcohol Services or For Substance Abuse Services call the number provided  OR PA Get Help Now ([phone])    OFFICE OF DRUG & ALCOHOL  MISSION STATEMENT  The Office of Drug and Alcohol is committed to the prevention and treatment of substance abuse problems in [facility]  Services are delivered in partnership with qualified providers and are guided by a philosophy that embraces hope, respect, and support for recovery  [facility] Drug and Alcohol provides a wide range of drug and alcohol services to [facility] residents in the areas of Prevention, Intervention, Treatment, and Recovery Support  Prevention and intervention services are provided to the community at large regardless of personal income  Treatment services require a comprehensive clinical and financial assessment  Trained  perform these assessments in order to determine the most appropriate funding resource and level of care, and to find the provider best suited to meet an individuals treatment needs  Recovery support services are non-clinical services that assist individuals and their family members to recover from substance use disorders  These services are supplemental to a treatment program and focus on outreach, engagement, education and other strategies to assist individuals in engaging, maintaining and sustaining long-term recovery  Recovery Support Services are also available to family members of individuals in various stages of recovery, through the utilization of Certified Family  services and/or parent support groups     CONTACT [address] Department of Health & Human Services P O  Joaquín Monahan, [address] Phone: [phone] HOURS 8:00am - 4:30pm Monday - Friday    [address]   The [address] [facility] is a peer-operated center in [place], [place] that offers recovery support services, workshops and trainings for the recovery community  The [employer] is a project of [facility] [employer], which is hosted by [place] [address] for the center is through the invi Drug and Alcohol and [employer]  In response to the COVID-19 pandemic and to continue to support our community,recovery support groups are currently being offered at the [employer] and on Zoom  1:1 services are now being offered face-to-face in the community or at the center, while enforcing and following strict CDC guidelines  Telehealth also continues to be an option for those that prefer that form of support services  For more information on programming, contact Simi Jeff, Recovery Programs Specialist, at [email or web address] or [phone]  Mayra 77 meeting list can be found at [email or web address] [email or web address]-[email or web address]-line-meetings  NA meeting list can be found at [email or web address] [email or web address]-[email or web address]-line-[email or web address]    Outpatient Treatment Providers  [address], [address] [phone]   An Riojas [address], 100 [facility] [phone]   Rosa Elena [address]jas[address] Clayton Montano   [address]   [address] [facility], [address] [phone]   [facility] HOSP - MENTAL [facility] CENTER   [address]   [place], 100 [facility] [phone]   Cj Dav[address]   [address]   [facility], [address] [facility], [address] [phone]

## 2022-01-07 NOTE — PROGRESS NOTES
Progress Note - Medical Toxicology    Sundeep Becerril 36 y o  male MRN: [id number]  Unit/Bed#: 5T DETOX 514-01 Encounter: [id number]  MEDICAL DETOX UNIT, LEVEL 4  Department of Medical Toxicology  Reason for Admission/Principal Problem: alcohol withdrawal, alcohol use disorder  Rounding Provider: Oscar Lin PA-C, Danilo Olivo, DO         Alcohol withdrawal syndrome with complication [facility])  Assessment & Plan  H/o chronic alcohol use; positive h/o withdrawal seizure  · Last drink 1/4/2022  Ethanol  1/5/2022 18:40pm: <3   · SEWS protocol discontinued on 1/6/22   Patient received a total of 5539 mg without complication  Will continue thiamine supplementation daily    Opioid use disorder, severe, dependence ([facility] [address] )  Assessment & Plan  · Snorts heroin/fentanyl: 20 bags daily    · Last use: 1/3/2022  · Previously prescribed suboxone in the past  · Per PDMP- last script written/filled 10/25/2021  · Patient also admits to using suboxone without prescription  · States last use 3 weeks ago   · 1/6/22 - Patient started on micro-induction of 8 mg of suboxone  · 1/7/22- Patient transitoned maintenance therapy     · Patient currently denies any withdrawal symptoms  · Will follow up with [facility]   ·     ·  Follow COWS protocol for medically assisted opioid withdrawal   · Plan to initiate MAT protocol once stable from alcohol withdrawal    Al[facility] use [facility], severe, dependence ([facility] )  Assessment & Plan  Patient with h/o chronic alcohol use  Per patient- currently consumes 12-18 12-oz cans of beer daily   · Last drink 1/4/2022  · Patient will be discharged to [facility]        Acute encephalopathy  [address] [place] [address]  · Patient originally presented to [facility] ED 1/5/2022- sent from [facility] for evaluation of confusion, diaphoresis, hallucinations   · Patient received 14 mg total ativan along with 5 mg haldol in ED PTA  · Arrives in 2 point soft restraints  · PTA work-up: urine sample unable to be obtained PTA*  · CT head 1/5/2022: "no acute intracranial abnormality"  · Coma panel 1/5/2022: acetaminophen level <2, salicylate level <3, ethanol level <3  · CBC 1/5/2022: WBCs 9 84, ANC 6 45  · CMP 1/5/2022: sodium 136, K+ 5 4, Anion gap 6  · Potassium 1/6/2022: 3 5  · Magnesium 2 4  · Currently- Patient oriented to person only  He denies any confusion or hallucinations  Patient is able to follow direction  Patient states that he does not remember previous episodes of delirium  · Upon re-assessment patient had episodes of somnolence  Pupils sluggish  · Continue thiamine supplementation  Hypokalemia  Assessment & Plan  · K+ checked on admission 1/6/2022: 3 5  · K + 3 3  · Replaced with 40 mEq of potassium chloride   · Replaced morning labs      Benzodiazepine abuse (HCC)  Assessment & Plan  · Reports daily use of benzodiazepines x 5 yrs (xanax and ativan)  · 3-4 "tabs" daily (uncertain of exact dose)  Reports last use 1/3/2022  · Discontinue SEWS  Patient received a total of 2177 mg without complication            VTE Pharmacologic Prophylaxis:   Pharmacologic: Enoxaparin (Lovenox)  Mechanical VTE Prophylaxis in Place: yes    Code Status: Level 1 - Full Code    Patient Centered Rounds: I have performed bedside rounds with nursing staff today  Discussions with Specialists or Other Care Team Provider:  Case management  Education and Discussions with Family / Patient: I personally did not discuss this patient with family  Time Spent for Care: 30 minutes  More than 50% of total time spent on counseling and coordination of care as described above  Current Length of Stay: 1 day(s)    Current Patient Status: Inpatient     Certification Statement: The patient will continue to require additional inpatient hospital stay due to acute encephalopathy, electrolyte abnormalities  Discharge Plan: Discharge to [place] [address] most likely on 1/08/22       Subjective:   Patient was seen and examined at bedside  Patient is unable to remember any acute events that occurred overnight  Patient reports to feeling better, admits to continued body aches  Upon reassessment patient became more lethargic  Patient denies any chest pain, abdominal pain, or shortness of breath  Objective:     Clinical Opiate Withdrawal Scale  Pulse: 80    SEWS Total Score: 0 (2022  8:00 AM)        Last 24 Hours Medication List:   Current Facility-Administered Medications   Medication Dose Route Frequency Provider Last Rate    buprenorphine-naloxone  8 mg Sublingual BID Cielo Defrancisco, PA-C      cloNIDine  0 1 mg Oral Q6H PRN Cielo Defrancisco, PA-C      enoxaparin  40 mg Subcutaneous Daily Cielo Defrancisco, PA-C      escitalopram  10 mg Oral Daily Cielo Defrancisco, PA-C      folic acid IVPB  1 mg Intravenous Daily Cielo Defrancisco, PA-C 1 mg (22 0836)    gabapentin  300 mg Oral Q8H PRN Cielo Defrancisco, PA-C      multivitamin-minerals  1 tablet Oral Daily Cielo Defrancisco, PA-C      ondansetron  4 mg Intravenous Q6H PRN Cielo Defrancisco, PA-C      sodium chloride  125 mL/hr Intravenous Continuous Cielo Defrancisco, PA-C 125 mL/hr (22 0459)    thiamine  500 mg Intravenous [facility] Cielo Defrancisco, PA-C 500 mg (22 0500)         Vitals:   Temp (24hrs), Av 2 °F (36 8 °C), Min:97 5 °F (36 4 °C), Max:98 8 °F (37 1 °C)    Temp:  [97 5 °F (36 4 °C)-98 8 °F (37 1 °C)] 98 °F (36 7 °C)  HR:  [] 80  Resp:  [16] 16  BP: (103-139)/(65-84) 133/78  SpO2:  [95 %-99 %] 95 %  Body mass index is 21 87 kg/m²  Input and Output Summary (last 24 hours): Intake/Output Summary (Last 24 hours) at 2022 1124  Last data filed at 2022 0900  Gross per 24 hour   Intake 300 ml   Output --   Net 300 ml       Physical Exam:   Physical Exam  Constitutional:       Appearance: He is not ill-appearing  Eyes:      Pupils:      Right eye: Pupil is sluggish  Left eye: Pupil is sluggish  Cardiovascular:      Rate and Rhythm: Normal rate and regular rhythm  Pulmonary:      Breath sounds: Normal breath sounds  Abdominal:      General: Bowel sounds are normal    Neurological:      Mental Status: He is lethargic  Psychiatric:         Behavior: Behavior is withdrawn  Additional Data:     Labs: keep all most recent labs as listed on admission templates   Results from last 7 days   Lab Units 01/07/22 0507 01/06/22 0436 01/06/22  0436   WBC Thousand/uL 10 90   < > 9 90   HEMOGLOBIN g/dL 13 5   < > 14 1   HEMATOCRIT % 38 7*   < > 41 6   PLATELETS Thousands/uL 317   < > 343   NEUTROS PCT %  --   --  67*   LYMPHS PCT %  --   --  25   MONOS PCT %  --   --  8   EOS PCT %  --   --  0    < > = values in this interval not displayed  Results from last 7 days   Lab Units 01/07/22 0507 01/06/22 0436 01/06/22  0436   SODIUM mmol/L 134*   < > 138   POTASSIUM mmol/L 3 3*   < > 3 7   CHLORIDE mmol/L 103   < > 107   CO2 mmol/L 26   < > 27   BUN mg/dL 3*   < > 8   CREATININE mg/dL 0 56*   < > 0 52*   ANION GAP mmol/L 5   < > 4*   CALCIUM mg/dL 8 0*   < > 8 6   ALBUMIN g/dL  --   --  4 0   TOTAL BILIRUBIN mg/dL  --   --  0 63   ALK PHOS U/L  --   --  53   ALT U/L  --   --  18   AST U/L  --   --  19   GLUCOSE RANDOM mg/dL 162*   < > 110*    < > = values in this interval not displayed  Results from last 7 days   Lab Units 01/05/22  1853   POC GLUCOSE mg/dl 95                    * I Have Reviewed All Lab Data Listed Above  * Additional Pertinent Lab Tests Reviewed: Giorgi 66 Admission Reviewed      Imaging Studies: I have personally reviewed pertinent reports  Recent Cultures (last 7 days): Today, Patient Was Seen By: Darek Virk PA-C    ** Please Note: Dictation voice to text software may have been used in the creation of this document   **

## 2022-01-08 VITALS
OXYGEN SATURATION: 97 % | BODY MASS INDEX: 21.76 KG/M2 | HEIGHT: 75 IN | SYSTOLIC BLOOD PRESSURE: 98 MMHG | TEMPERATURE: 98.1 F | DIASTOLIC BLOOD PRESSURE: 67 MMHG | RESPIRATION RATE: 18 BRPM | WEIGHT: 175 LBS | HEART RATE: 72 BPM

## 2022-01-08 RX ORDER — ESCITALOPRAM OXALATE 10 MG/1
10 TABLET ORAL DAILY
Qty: 14 TABLET | Refills: 0 | Status: SHIPPED | OUTPATIENT
Start: 2022-01-08 | End: 2022-01-22

## 2022-01-08 RX ORDER — BUPRENORPHINE AND NALOXONE 8; 2 MG/1; MG/1
8 FILM, SOLUBLE BUCCAL; SUBLINGUAL 2 TIMES DAILY
Qty: 28 FILM | Refills: 0 | Status: SHIPPED | OUTPATIENT
Start: 2022-01-08 | End: 2022-01-22

## 2022-01-08 RX ADMIN — ENOXAPARIN SODIUM 40 MG: 40 INJECTION SUBCUTANEOUS at 08:26

## 2022-01-08 RX ADMIN — BUPRENORPHINE AND NALOXONE 8 MG: 8; 2 FILM BUCCAL; SUBLINGUAL at 08:26

## 2022-01-08 RX ADMIN — GABAPENTIN 300 MG: 300 CAPSULE ORAL at 10:30

## 2022-01-08 RX ADMIN — THIAMINE HYDROCHLORIDE 500 MG: 100 INJECTION, SOLUTION INTRAMUSCULAR; INTRAVENOUS at 06:45

## 2022-01-08 RX ADMIN — FOLIC ACID 1 MG: 5 INJECTION, SOLUTION INTRAMUSCULAR; INTRAVENOUS; SUBCUTANEOUS at 08:26

## 2022-01-08 RX ADMIN — ESCITALOPRAM OXALATE 10 MG: 10 TABLET ORAL at 08:26

## 2022-01-08 RX ADMIN — MULTIPLE VITAMINS W/ MINERALS TAB 1 TABLET: TAB ORAL at 08:26

## 2022-01-08 NOTE — ASSESSMENT & PLAN NOTE
H/o chronic alcohol use; positive h/o withdrawal seizure  · Last drink 1/4/2022  Ethanol  1/5/2022 18:40pm: <3   · SEWS protocol discontinued on 1/6/22   Patient received a total of 0301 mg without complication    Will continue thiamine supplementation daily

## 2022-01-08 NOTE — NURSING NOTE
Patient discharged to [place] [address], AVS reviewed with patient, report called to nurse Ten Mcpherson at receiving facility  Sent with fourteen days suboxone supply  Personal belongings returned to patient, no further question or concern  Ambulated to hospital lobby with RNuriel transporting

## 2022-01-08 NOTE — CASE MANAGEMENT
Case Management Discharge Planning Note    Patient name Justin Witt  Location 5T DETOX 514/5T DETOX 46* MRN [id number]  : 1990 Date 2022       Current Admission Date: 2022  Current Admission Diagnosis:Amphetamine withdrawal with delirium [facility])   Patient Active Problem List    Diagnosis Date Noted    Alcohol withdrawal syndrome with complication ([facility] )     Alcohol use disorder, severe, dependence ([facility] ) 2022    Benzodiazepine abuse ([facility] ) 2022    Opioid use disorder, severe, dependence ([facility] ) 2022    Acute encephalopathy 2022    Hypokalemia 2022    Amphetamine withdrawal with delirium ([place] [address] ) 2022      LOS (days): 2  Geometric Mean LOS (GMLOS) (days):   Days to GMLOS:     OBJECTIVE:  Risk of Unplanned Readmission Score: 17         Current admission status: Inpatient   Preferred Pharmacy:   PATIENT/FAMILY REPORTS NO PREFERRED PHARMACY  No address on file      [address] [place] [address]  Phone: [phone] Fax: [phone]    Primary Care Provider: Joon Orozco    Primary Insurance: [place] [employer]  Secondary Insurance:     DISCHARGE DETAILS: CM coordinated with [address] [place] [address] to transfer pt to inpatient substance use treatment upon discharge  CM obtained 2 weeks worth of suboxone from [place] which was given to pt upon discharge; CM contacted [facility] and pt was discharged to [address] [place] [address]       Discharge planning discussed with[de-identified] patient  Freedom of Choice: Yes                   Contacts  Patient Contacts: [address] [place] [address]  Relationship to Patient[de-identified] Treatment Provider  Contact Method: Phone  Reason/Outcome: Discharge Planning,Continuity of Care              Other Referral/Resources/Interventions Provided:  Referrals Provided[de-identified] Crisis Hotline,IOP,Therapist,Peer Specialist (inpatient substance use treatment)  Post Acute Placement to[de-identified] Substance Abuse Treatment    Would you like to participate in our [address] service program?  : Yes          Transport at Discharge : Automobile        Transported by [employer] and Unit #):  SLEZHANNA        Transport Service Arrived: Yes  Transfer Mode: Self  Accompanied by: Alone

## 2022-01-08 NOTE — ASSESSMENT & PLAN NOTE
· Patient originally presented to [facility] ED 1/5/2022- sent from [facility] for evaluation of confusion, diaphoresis, hallucinations   · Patient received 14 mg total ativan along with 5 mg haldol in ED PTA  · Arrives in 2 point soft restraints  · PTA work-up: urine sample unable to be obtained PTA*  · CT head 1/5/2022: "no acute intracranial abnormality"  · Coma panel 1/5/2022: acetaminophen level <2, salicylate level <3, ethanol level <3  · CBC 1/5/2022: WBCs 9 84, ANC 6 45  · CMP 1/5/2022: sodium 136, K+ 5 4, Anion gap 6  · Potassium 1/6/2022: 3 5  · Magnesium 2 4  · Currently- Patient oriented to person only  He denies any confusion or hallucinations  Patient is able to follow direction  Patient states that he does not remember previous episodes of delirium  · Upon re-assessment patient had episodes of somnolence  Pupils sluggish     · Continue thiamine supplementation

## 2022-01-08 NOTE — ASSESSMENT & PLAN NOTE
· Reports daily use of benzodiazepines x 5 yrs (xanax and ativan)  · 3-4 "tabs" daily (uncertain of exact dose)  Reports last use 1/3/2022  · Discontinue SEWS  Patient received a total of 3263 mg without complication

## 2022-01-08 NOTE — PLAN OF CARE
Problem: SUBSTANCE USE/ABUSE  Goal: By discharge, will develop insight into their chemical dependency and sustain motivation to continue in recovery  Description: INTERVENTIONS:  - Attends all daily group sessions and scheduled AA groups  - Actively practices coping skills through participation in the therapeutic community and adherence to program rules  - Reviews and completes assignments from individual treatment plan  - Assist patient development of understanding of their personal cycle of addiction and relapse triggers  Outcome: Progressing  Goal: By discharge, patient will have ongoing treatment plan addressing chemical dependency  Description: INTERVENTIONS:  - Assist patient with resources and/or appointments for ongoing recovery based living  Outcome: Progressing     Problem: SAFETY,RESTRAINT: NV/NON-SELF DESTRUCTIVE BEHAVIOR  Goal: Remains free of harm/injury (restraint for non violent/non self-detsructive behavior)  Description: INTERVENTIONS:  - Instruct patient/family regarding restraint use   - Assess and monitor physiologic and psychological status   - Provide interventions and comfort measures to meet assessed patient needs   - Identify and implement measures to help patient regain control  - Assess readiness for release of restraint   Outcome: Progressing  Goal: Returns to optimal restraint-free functioning  Description: INTERVENTIONS:  - Assess the patient's behavior and symptoms that indicate continued need for restraint  - Identify and implement measures to help patient regain control  - Assess readiness for release of restraint   Outcome: Progressing     Problem: MOBILITY - ADULT  Goal: Maintain or return to baseline ADL function  Description: INTERVENTIONS:  -  Assess patient's ability to carry out ADLs; assess patient's baseline for ADL function and identify physical deficits which impact ability to perform ADLs (bathing, care of mouth/teeth, toileting, grooming, dressing, etc )  - Assess/evaluate cause of self-care deficits   - Assess range of motion  - Assess patient's mobility; develop plan if impaired  - Assess patient's need for assistive devices and provide as appropriate  - Encourage maximum independence but intervene and supervise when necessary  - Involve family in performance of ADLs  - Assess for home care needs following discharge   - Consider OT consult to assist with ADL evaluation and planning for discharge  - Provide patient education as appropriate  Outcome: Progressing     Problem: Prexisting or High Potential for Compromised Skin Integrity  Goal: Skin integrity is maintained or improved  Description: INTERVENTIONS:  - Identify patients at risk for skin breakdown  - Assess and monitor skin integrity  - Assess and monitor nutrition and hydration status  - Monitor labs   - Assess for incontinence   - Turn and reposition patient  - Assist with mobility/ambulation  - Relieve pressure over bony prominences  - Avoid friction and shearing  - Provide appropriate hygiene as needed including keeping skin clean and dry  - Evaluate need for skin moisturizer/barrier cream  - Collaborate with interdisciplinary team   - Patient/family teaching  - Consider wound care consult   Outcome: Progressing

## 2022-01-08 NOTE — CASE MANAGEMENT
CM contacted [address] [facility] ([phone]) of [facility] to complete prior authorization for inpatient substance use treatment  CM spoke to Alice, care manager and provided clinical information; pt was approved for 16 days at [facility] will need to [employer] upon pt arrival to facility  CM relayed this information to nursing staff who will call report to [facility] and will arrange transportation via [employer] to inpatient substance use treatment  Pt signed Diana Postin for Transportation

## 2022-01-08 NOTE — ASSESSMENT & PLAN NOTE
Patient with h/o chronic alcohol use  Per patient- currently consumes 12-18 12-oz cans of beer daily   · Last drink 1/4/2022  · Patient will be discharged to [facility]

## 2022-01-08 NOTE — ASSESSMENT & PLAN NOTE
· Snorts heroin/fentanyl: 20 bags daily    · Last use: 1/3/2022  · Previously prescribed suboxone in the past  · Per PDMP- last script written/filled 10/25/2021  · Patient also admits to using suboxone without prescription  · States last use 3 weeks ago   · 1/6/22 - Patient started on micro-induction of 8 mg of suboxone  · 1/7/22- Patient transitoned maintenance therapy     · Patient currently denies any withdrawal symptoms  · Will follow up with [facility]     ·  Follow COWS protocol for medically assisted opioid withdrawal   · Plan to initiate MAT protocol once stable from alcohol withdrawal

## 2022-01-10 NOTE — UTILIZATION REVIEW
Notification of Discharge   This is a Notification of Discharge from our facility [address]  Please be advised that this patient has been discharge from our facility  Below you will find the admission and discharge date and time including the patients disposition  UTILIZATION REVIEW CONTACT:  Sharon Concepcion  Utilization   Network Utilization Review Department  Phone: [phone] x carefully listen to the prompts  All voicemails are confidential   Email: [email or web address]     PHYSICIAN ADVISORY SERVICES:  FOR JGWA-HO-XIWN REVIEW - MEDICAL NECESSITY DENIAL  Phone: [phone]  Fax: [phone]  Email: [email or web address]     PRESENTATION DATE: 1/6/2022  1:52 AM  OBERVATION ADMISSION DATE:   INPATIENT ADMISSION DATE: 1/6/22  1:52 AM   DISCHARGE DATE: 1/8/2022 12:07 PM  DISPOSITION: Home/Self Care Home/Self Care      IMPORTANT INFORMATION:  Send all requests for admission clinical reviews, approved or denied determinations and any other requests to dedicated fax number below belonging to the campus where the patient is receiving treatment   List of dedicated fax numbers:  [address] DENIALS (Administrative/Medical Necessity) [phone]   [address] (Maternity/NICU/Pediatrics) [phone]   Kathy Flatten [phone]   [place] [phone]   Kaylie Fisher [phone]   Oliver [address] [phone]   [facility]  [phone]   2205 [facility]  2401 [facility] [address] [facility] [phone]

## 2022-02-01 NOTE — UTILIZATION REVIEW
Notification of Discharge   This is a Notification of Discharge from our facility [address]  Please be advised that this patient has been discharge from our facility  Below you will find the admission and discharge date and time including the patients disposition  UTILIZATION REVIEW CONTACT:  Vladislav Quiroz  Utilization   Network Utilization Review Department  Phone: [phone] x carefully listen to the prompts  All voicemails are confidential   Email: [email or web address]     PHYSICIAN ADVISORY SERVICES:  FOR WDLZ-UR-TULL REVIEW - MEDICAL NECESSITY DENIAL  Phone: [phone]  Fax: [phone]  Email: [email or web address]     PRESENTATION DATE: 1/6/2022  1:52 AM  OBERVATION ADMISSION DATE:   INPATIENT ADMISSION DATE: 1/6/22  1:52 AM   DISCHARGE DATE: 1/8/2022 12:07 PM  DISPOSITION: Discharge/Transfer to not defined Healthcare Facility Discharge/Transfer to not defined Healthcare Facility      IMPORTANT INFORMATION:  Send all requests for admission clinical reviews, approved or denied determinations and any other requests to dedicated fax number below belonging to the campus where the patient is receiving treatment   List of dedicated fax numbers:  [address] [place] [address] DENIALS (Administrative/Medical Necessity) [phone]   [address] [facility] (Maternity/NICU/Pediatrics) [phone]   Jasbir Bailons [phone]   130 [facility] [phone]   [address] [place] [address] [phone]   [address] [place] [address],[address] [phone]   [facility]  [phone]   [address] [place] [address], [address]  2401 [facility] 1000 [address] [facility] [phone]